# Patient Record
Sex: FEMALE | Race: WHITE | NOT HISPANIC OR LATINO
[De-identification: names, ages, dates, MRNs, and addresses within clinical notes are randomized per-mention and may not be internally consistent; named-entity substitution may affect disease eponyms.]

---

## 2017-10-03 ENCOUNTER — RESULT REVIEW (OUTPATIENT)
Age: 67
End: 2017-10-03

## 2017-10-03 ENCOUNTER — OUTPATIENT (OUTPATIENT)
Dept: OUTPATIENT SERVICES | Facility: HOSPITAL | Age: 67
LOS: 1 days | Discharge: HOME | End: 2017-10-03

## 2017-10-05 LAB — SURGICAL PATHOLOGY STUDY: SIGNIFICANT CHANGE UP

## 2017-10-09 DIAGNOSIS — C54.1 MALIGNANT NEOPLASM OF ENDOMETRIUM: ICD-10-CM

## 2017-10-09 DIAGNOSIS — Z88.0 ALLERGY STATUS TO PENICILLIN: ICD-10-CM

## 2017-10-09 DIAGNOSIS — Z87.891 PERSONAL HISTORY OF NICOTINE DEPENDENCE: ICD-10-CM

## 2017-10-09 DIAGNOSIS — Z88.1 ALLERGY STATUS TO OTHER ANTIBIOTIC AGENTS STATUS: ICD-10-CM

## 2017-10-11 ENCOUNTER — APPOINTMENT (OUTPATIENT)
Dept: GYNECOLOGIC ONCOLOGY | Facility: CLINIC | Age: 67
End: 2017-10-11
Payer: COMMERCIAL

## 2017-10-11 VITALS
HEIGHT: 70.5 IN | BODY MASS INDEX: 24.07 KG/M2 | OXYGEN SATURATION: 98 % | SYSTOLIC BLOOD PRESSURE: 159 MMHG | HEART RATE: 88 BPM | WEIGHT: 170 LBS | DIASTOLIC BLOOD PRESSURE: 79 MMHG

## 2017-10-11 DIAGNOSIS — F41.1 GENERALIZED ANXIETY DISORDER: ICD-10-CM

## 2017-10-11 DIAGNOSIS — Z87.891 PERSONAL HISTORY OF NICOTINE DEPENDENCE: ICD-10-CM

## 2017-10-11 DIAGNOSIS — Z85.828 PERSONAL HISTORY OF OTHER MALIGNANT NEOPLASM OF SKIN: ICD-10-CM

## 2017-10-11 PROCEDURE — 99205 OFFICE O/P NEW HI 60 MIN: CPT

## 2017-10-11 PROCEDURE — 36415 COLL VENOUS BLD VENIPUNCTURE: CPT

## 2017-10-11 RX ORDER — OMEPRAZOLE 20 MG/1
20 CAPSULE, DELAYED RELEASE ORAL
Qty: 30 | Refills: 0 | Status: COMPLETED | COMMUNITY
Start: 2017-08-20

## 2017-10-11 RX ORDER — ALPRAZOLAM 1 MG/1
1 TABLET ORAL
Qty: 60 | Refills: 0 | Status: COMPLETED | COMMUNITY
Start: 2017-08-22

## 2017-10-11 RX ORDER — AMITRIPTYLINE HYDROCHLORIDE 10 MG/1
10 TABLET, FILM COATED ORAL
Qty: 60 | Refills: 0 | Status: COMPLETED | COMMUNITY
Start: 2017-07-18

## 2017-10-11 RX ORDER — AZITHROMYCIN 250 MG/1
250 TABLET, FILM COATED ORAL
Qty: 6 | Refills: 0 | Status: COMPLETED | COMMUNITY
Start: 2017-08-20

## 2017-10-12 LAB — CANCER AG125 SERPL-ACNC: 108 U/ML

## 2017-10-14 ENCOUNTER — OUTPATIENT (OUTPATIENT)
Dept: OUTPATIENT SERVICES | Facility: HOSPITAL | Age: 67
LOS: 1 days | End: 2017-10-14
Payer: COMMERCIAL

## 2017-10-14 PROCEDURE — 74177 CT ABD & PELVIS W/CONTRAST: CPT | Mod: 26

## 2017-10-14 PROCEDURE — 71260 CT THORAX DX C+: CPT | Mod: 26

## 2017-10-14 PROCEDURE — 74177 CT ABD & PELVIS W/CONTRAST: CPT

## 2017-10-14 PROCEDURE — 71260 CT THORAX DX C+: CPT

## 2017-10-17 ENCOUNTER — INPATIENT (INPATIENT)
Facility: HOSPITAL | Age: 67
LOS: 1 days | Discharge: HOME | DRG: 740 | End: 2017-10-19
Attending: OBSTETRICS & GYNECOLOGY | Admitting: OBSTETRICS & GYNECOLOGY
Payer: COMMERCIAL

## 2017-10-17 ENCOUNTER — APPOINTMENT (OUTPATIENT)
Dept: GYNECOLOGIC ONCOLOGY | Facility: HOSPITAL | Age: 67
End: 2017-10-17

## 2017-10-17 ENCOUNTER — RESULT REVIEW (OUTPATIENT)
Age: 67
End: 2017-10-17

## 2017-10-17 VITALS
RESPIRATION RATE: 18 BRPM | DIASTOLIC BLOOD PRESSURE: 62 MMHG | HEIGHT: 70.5 IN | OXYGEN SATURATION: 98 % | SYSTOLIC BLOOD PRESSURE: 18 MMHG | HEART RATE: 80 BPM | TEMPERATURE: 96 F | WEIGHT: 169.09 LBS

## 2017-10-17 PROCEDURE — 58210 EXTENSIVE HYSTERECTOMY: CPT | Mod: 22

## 2017-10-17 PROCEDURE — 58210 EXTENSIVE HYSTERECTOMY: CPT | Mod: 80

## 2017-10-17 RX ORDER — ACETAMINOPHEN 500 MG
975 TABLET ORAL EVERY 8 HOURS
Qty: 0 | Refills: 0 | Status: COMPLETED | OUTPATIENT
Start: 2017-10-17 | End: 2017-10-18

## 2017-10-17 RX ORDER — OXYCODONE HYDROCHLORIDE 5 MG/1
10 TABLET ORAL EVERY 4 HOURS
Qty: 0 | Refills: 0 | Status: DISCONTINUED | OUTPATIENT
Start: 2017-10-17 | End: 2017-10-19

## 2017-10-17 RX ORDER — BUPIVACAINE 13.3 MG/ML
20 INJECTION, SUSPENSION, LIPOSOMAL INFILTRATION ONCE
Qty: 0 | Refills: 0 | Status: DISCONTINUED | OUTPATIENT
Start: 2017-10-17 | End: 2017-10-19

## 2017-10-17 RX ORDER — DEXAMETHASONE 0.5 MG/5ML
4 ELIXIR ORAL EVERY 8 HOURS
Qty: 0 | Refills: 0 | Status: COMPLETED | OUTPATIENT
Start: 2017-10-17 | End: 2017-10-18

## 2017-10-17 RX ORDER — HYDROMORPHONE HYDROCHLORIDE 2 MG/ML
0.2 INJECTION INTRAMUSCULAR; INTRAVENOUS; SUBCUTANEOUS
Qty: 0 | Refills: 0 | Status: DISCONTINUED | OUTPATIENT
Start: 2017-10-17 | End: 2017-10-19

## 2017-10-17 RX ORDER — OXYCODONE HYDROCHLORIDE 5 MG/1
5 TABLET ORAL EVERY 4 HOURS
Qty: 0 | Refills: 0 | Status: DISCONTINUED | OUTPATIENT
Start: 2017-10-17 | End: 2017-10-19

## 2017-10-17 RX ORDER — KETOROLAC TROMETHAMINE 30 MG/ML
30 SYRINGE (ML) INJECTION EVERY 8 HOURS
Qty: 0 | Refills: 0 | Status: DISCONTINUED | OUTPATIENT
Start: 2017-10-17 | End: 2017-10-18

## 2017-10-17 RX ORDER — ONDANSETRON 8 MG/1
8 TABLET, FILM COATED ORAL EVERY 8 HOURS
Qty: 0 | Refills: 0 | Status: COMPLETED | OUTPATIENT
Start: 2017-10-17 | End: 2017-10-18

## 2017-10-17 RX ORDER — SODIUM CHLORIDE 9 MG/ML
1000 INJECTION, SOLUTION INTRAVENOUS
Qty: 0 | Refills: 0 | Status: DISCONTINUED | OUTPATIENT
Start: 2017-10-17 | End: 2017-10-18

## 2017-10-17 RX ORDER — AMITRIPTYLINE HCL 25 MG
10 TABLET ORAL AT BEDTIME
Qty: 0 | Refills: 0 | Status: DISCONTINUED | OUTPATIENT
Start: 2017-10-17 | End: 2017-10-19

## 2017-10-17 RX ORDER — ENOXAPARIN SODIUM 100 MG/ML
40 INJECTION SUBCUTANEOUS EVERY 24 HOURS
Qty: 0 | Refills: 0 | Status: DISCONTINUED | OUTPATIENT
Start: 2017-10-17 | End: 2017-10-19

## 2017-10-17 RX ORDER — ALPRAZOLAM 0.25 MG
0.5 TABLET ORAL THREE TIMES A DAY
Qty: 0 | Refills: 0 | Status: DISCONTINUED | OUTPATIENT
Start: 2017-10-17 | End: 2017-10-19

## 2017-10-17 RX ADMIN — Medication 0.5 MILLIGRAM(S): at 22:38

## 2017-10-17 RX ADMIN — Medication 30 MILLIGRAM(S): at 23:00

## 2017-10-17 RX ADMIN — ONDANSETRON 8 MILLIGRAM(S): 8 TABLET, FILM COATED ORAL at 22:38

## 2017-10-17 RX ADMIN — Medication 4 MILLIGRAM(S): at 18:55

## 2017-10-17 RX ADMIN — Medication 975 MILLIGRAM(S): at 23:37

## 2017-10-17 RX ADMIN — Medication 30 MILLIGRAM(S): at 22:39

## 2017-10-17 RX ADMIN — Medication 975 MILLIGRAM(S): at 22:37

## 2017-10-17 RX ADMIN — ENOXAPARIN SODIUM 40 MILLIGRAM(S): 100 INJECTION SUBCUTANEOUS at 22:38

## 2017-10-17 NOTE — BRIEF OPERATIVE NOTE - PROCEDURE
<<-----Click on this checkbox to enter Procedure Lymph node dissection  10/17/2017    Active  TORI  Omentectomy  10/17/2017    Active  TORI  Total abdominal hysterectomy and bilateral salpingo-oophorectomy  10/17/2017    Active  TORI

## 2017-10-17 NOTE — H&P ADULT - HISTORY OF PRESENT ILLNESS
68yo  presenting with D&C confirmed diagnosis of clear cell adenocarcinoma of the uterus s/p long history of abnormal uterine bleeding.  Today, admitted for ex lap EMY, BSO, omentectomy, pelvic/para-aortic lymph node dissection.

## 2017-10-17 NOTE — BRIEF OPERATIVE NOTE - OPERATION/FINDINGS
17cm uterus, bilateral fallopian tubes and ovaries wnl, mild adhesions, enlarged para-aortic/pelvic nodes

## 2017-10-17 NOTE — PROGRESS NOTE ADULT - SUBJECTIVE AND OBJECTIVE BOX
GYN POC    Pt reports pain well controlled and is resting comfortably.  Denies n/v, fever, chills, chest pain, sob.    T(F): 97.5 (10-17-17 @ 14:05), Max: 97.8 (10-17-17 @ 11:53)  HR: 75 (10-17-17 @ 17:57) (67 - 91)  BP: 114/67 (10-17-17 @ 17:57) (18/62 - 130/54)  RR: 16 (10-17-17 @ 17:57) (14 - 18)  SpO2: 97% (10-17-17 @ 17:57) (94% - 100%)  Wt(kg): --    10-17 @ 07:01  -  10-17 @ 18:07  --------------------------------------------------------  IN: 2775 mL / OUT: 410 mL / NET: 2365 mL    acetaminophen   Tablet. 975 milliGRAM(s) Oral every 8 hours  ALPRAZolam 0.5 milliGRAM(s) Oral three times a day  amitriptyline 10 milliGRAM(s) Oral at bedtime  BUpivacaine liposome 1.3% Injectable (no eMAR) 20 milliLiter(s) Local Injection once  dexamethasone     Tablet 4 milliGRAM(s) Oral every 8 hours  dextrose 5% + sodium chloride 0.45%. 1000 milliLiter(s) IV Continuous <Continuous>  enoxaparin Injectable 40 milliGRAM(s) SubCutaneous every 24 hours  HYDROmorphone  Injectable 0.2 milliGRAM(s) IV Push every 3 hours PRN breakthrough  ketorolac   Injectable 30 milliGRAM(s) IV Push every 8 hours PRN Severe Pain  ondansetron Injectable 8 milliGRAM(s) IV Push every 8 hours  oxyCODONE    IR 5 milliGRAM(s) Oral every 4 hours PRN Moderate Pain (4 - 6)  oxyCODONE    IR 10 milliGRAM(s) Oral every 4 hours PRN Severe Pain (7 - 10)      Physical exam:  Constitutional: NAD  Pulmonary: clear to auscultation bilaterally  Cardiovascular: regular rate and rhythm  Abdomen: Soft, appropriately tender, nondistended, incision under clean jesusita vac  Extremities: no lower extremity edema, or calve tenderness. SCDs in place    A: POD0 s/p ex lap EMY, BSO, p/p LND, omentectomy.  Pt doing very well s/p operation.     Plan:  1. Vital signs stable, continue to monitor per protocol  2. Pain mgmt as ordered  3. DVT prophylaxis: SCDs  4. CV: IVH   5. Pulm: Incentive spirometer (at least 10 times per hour while awake)   6. GI: Clear diet  7. : Junior   8. Follow up labs: AM CBC  9. Activity: bedrest tonight     Welebir PGY4

## 2017-10-17 NOTE — H&P ADULT - ASSESSMENT
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

## 2017-10-17 NOTE — PROGRESS NOTE ADULT - ASSESSMENT
A: POD0 s/p ex lap MEY, BSO, p/p LND, omentectomy.  Pt doing very well s/p operation.     Plan:  1. Vital signs stable, continue to monitor per protocol  2. Pain mgmt as ordered  3. DVT prophylaxis: SCDs  4. CV: IVH   5. Pulm: Incentive spirometer (at least 10 times per hour while awake)   6. GI: Clear diet  7. : Junior   8. Follow up labs: AM CBC  9. Activity: bedrest tonight

## 2017-10-18 LAB
ALBUMIN SERPL ELPH-MCNC: 3.9 G/DL — SIGNIFICANT CHANGE UP (ref 3.3–5)
ALP SERPL-CCNC: 81 U/L — SIGNIFICANT CHANGE UP (ref 40–120)
ALT FLD-CCNC: 10 U/L — SIGNIFICANT CHANGE UP (ref 10–45)
ANION GAP SERPL CALC-SCNC: 13 MMOL/L — SIGNIFICANT CHANGE UP (ref 5–17)
AST SERPL-CCNC: 16 U/L — SIGNIFICANT CHANGE UP (ref 10–40)
BILIRUB SERPL-MCNC: 0.3 MG/DL — SIGNIFICANT CHANGE UP (ref 0.2–1.2)
BUN SERPL-MCNC: 8 MG/DL — SIGNIFICANT CHANGE UP (ref 7–23)
CALCIUM SERPL-MCNC: 9.1 MG/DL — SIGNIFICANT CHANGE UP (ref 8.4–10.5)
CHLORIDE SERPL-SCNC: 104 MMOL/L — SIGNIFICANT CHANGE UP (ref 96–108)
CO2 SERPL-SCNC: 25 MMOL/L — SIGNIFICANT CHANGE UP (ref 22–31)
CREAT SERPL-MCNC: 0.95 MG/DL — SIGNIFICANT CHANGE UP (ref 0.5–1.3)
GLUCOSE SERPL-MCNC: 146 MG/DL — HIGH (ref 70–99)
HCT VFR BLD CALC: 35.3 % — SIGNIFICANT CHANGE UP (ref 34.5–45)
HGB BLD-MCNC: 11.6 G/DL — SIGNIFICANT CHANGE UP (ref 11.5–15.5)
MAGNESIUM SERPL-MCNC: 2.1 MG/DL — SIGNIFICANT CHANGE UP (ref 1.6–2.6)
MCHC RBC-ENTMCNC: 29.3 PG — SIGNIFICANT CHANGE UP (ref 27–34)
MCHC RBC-ENTMCNC: 32.9 G/DL — SIGNIFICANT CHANGE UP (ref 32–36)
MCV RBC AUTO: 89.1 FL — SIGNIFICANT CHANGE UP (ref 80–100)
NON-GYNECOLOGICAL CYTOLOGY STUDY: SIGNIFICANT CHANGE UP
PHOSPHATE SERPL-MCNC: 2.8 MG/DL — SIGNIFICANT CHANGE UP (ref 2.5–4.5)
PLATELET # BLD AUTO: 262 K/UL — SIGNIFICANT CHANGE UP (ref 150–400)
POTASSIUM SERPL-MCNC: 4.1 MMOL/L — SIGNIFICANT CHANGE UP (ref 3.5–5.3)
POTASSIUM SERPL-SCNC: 4.1 MMOL/L — SIGNIFICANT CHANGE UP (ref 3.5–5.3)
PROT SERPL-MCNC: 6.6 G/DL — SIGNIFICANT CHANGE UP (ref 6–8.3)
RBC # BLD: 3.96 M/UL — SIGNIFICANT CHANGE UP (ref 3.8–5.2)
RBC # FLD: 12.6 % — SIGNIFICANT CHANGE UP (ref 10.3–16.9)
SODIUM SERPL-SCNC: 142 MMOL/L — SIGNIFICANT CHANGE UP (ref 135–145)
WBC # BLD: 9.8 K/UL — SIGNIFICANT CHANGE UP (ref 3.8–10.5)
WBC # FLD AUTO: 9.8 K/UL — SIGNIFICANT CHANGE UP (ref 3.8–10.5)

## 2017-10-18 RX ORDER — ACETAMINOPHEN 500 MG
650 TABLET ORAL EVERY 6 HOURS
Qty: 0 | Refills: 0 | Status: DISCONTINUED | OUTPATIENT
Start: 2017-10-18 | End: 2017-10-19

## 2017-10-18 RX ADMIN — Medication 975 MILLIGRAM(S): at 06:42

## 2017-10-18 RX ADMIN — Medication 650 MILLIGRAM(S): at 17:43

## 2017-10-18 RX ADMIN — Medication 30 MILLIGRAM(S): at 05:53

## 2017-10-18 RX ADMIN — Medication 4 MILLIGRAM(S): at 11:24

## 2017-10-18 RX ADMIN — Medication 0.5 MILLIGRAM(S): at 13:46

## 2017-10-18 RX ADMIN — Medication 650 MILLIGRAM(S): at 18:15

## 2017-10-18 RX ADMIN — ONDANSETRON 8 MILLIGRAM(S): 8 TABLET, FILM COATED ORAL at 05:45

## 2017-10-18 RX ADMIN — Medication 0.5 MILLIGRAM(S): at 23:09

## 2017-10-18 RX ADMIN — Medication 30 MILLIGRAM(S): at 06:15

## 2017-10-18 RX ADMIN — Medication 4 MILLIGRAM(S): at 04:04

## 2017-10-18 RX ADMIN — ENOXAPARIN SODIUM 40 MILLIGRAM(S): 100 INJECTION SUBCUTANEOUS at 23:09

## 2017-10-18 RX ADMIN — Medication 0.5 MILLIGRAM(S): at 05:45

## 2017-10-18 RX ADMIN — Medication 975 MILLIGRAM(S): at 05:45

## 2017-10-18 RX ADMIN — Medication 10 MILLIGRAM(S): at 23:09

## 2017-10-18 NOTE — PROGRESS NOTE ADULT - ATTENDING COMMENTS
Patients seen and examined this morning. Tolerating GI soft diet this morning with minimal complaints. Pain well controlled on current regimen. Ambulation encouraged. Questions regarding surgery and recovery answered. Patient appeared less anxious this morning. Labs reviewed and appropriate. Void trial passed. Discussed likely dispo home tomorrow if she continues to have uncomplicated post op course.

## 2017-10-18 NOTE — PROGRESS NOTE ADULT - SUBJECTIVE AND OBJECTIVE BOX
POD1 am.  Pt doing well.  Pain well controlled overnight and rested.  Pt anxious about surgery, results, and the plan for the future, but no toxic complaint.  Tolerated clear diet and passed flatus.  Denies n/v/d, fever, chills, palpitations, chest pain, sob.    Vital Signs Last 24 Hrs  T(C): 36.6 (18 Oct 2017 05:44), Max: 36.6 (17 Oct 2017 11:53)  T(F): 97.9 (18 Oct 2017 05:44), Max: 97.9 (18 Oct 2017 05:44)  HR: 69 (18 Oct 2017 05:44) (67 - 97)  BP: 135/76 (18 Oct 2017 05:44) (18/62 - 155/78)  BP(mean): --  RR: 16 (18 Oct 2017 05:44) (14 - 18)  SpO2: 98% (18 Oct 2017 05:44) (94% - 100%)  I&O's Summary    17 Oct 2017 07:01  -  18 Oct 2017 06:14  --------------------------------------------------------  IN: 3265 mL / OUT: 2785 mL / NET: 480 mL      MEDICATIONS  (STANDING):  ALPRAZolam 0.5 milliGRAM(s) Oral three times a day  amitriptyline 10 milliGRAM(s) Oral at bedtime  BUpivacaine liposome 1.3% Injectable (no eMAR) 20 milliLiter(s) Local Injection once  dexamethasone     Tablet 4 milliGRAM(s) Oral every 8 hours  enoxaparin Injectable 40 milliGRAM(s) SubCutaneous every 24 hours    MEDICATIONS  (PRN):  HYDROmorphone  Injectable 0.2 milliGRAM(s) IV Push every 3 hours PRN breakthrough  oxyCODONE    IR 5 milliGRAM(s) Oral every 4 hours PRN Moderate Pain (4 - 6)  oxyCODONE    IR 10 milliGRAM(s) Oral every 4 hours PRN Severe Pain (7 - 10)    ROS: as per interval hpi    PE:  A&Ox3, NAD  CTAB  RRR  Abd s/appropriately tender/nd, Incision under cd jesusita vac, hypoactive BS  Ext no calf tenderness/swelling    A/P: POD1 s/p ex lap EMY, BSO, staging for clear cell endometrial adenocarcinoma.  Pt is progressing well and advancing to anticipated goals of postoperative recovery.    Plan:  FEN Advanced to regular diet  Neuro: Toradol + Tylenol q8x24h, oxycodone 5/10 PRN, Dilaudid BT, Psych home meds restarted  Cards: none  Pulm: none  GI: Monitor vac dressing, zofran for nausea q8  : s/p nicolas  Heme: f/u AM CBC  DVT ppx: Lovenox 40  General: Ambulation and IS encouraged    Welebir PGY4 POD1 am.  Pt doing well.  Pain well controlled overnight and rested.  Pt anxious about surgery, results, and the plan for the future. Tolerated clear diet and passed flatus.  Denies n/v/d, fever, chills, palpitations, chest pain, sob.    Vital Signs Last 24 Hrs  T(C): 36.6 (18 Oct 2017 05:44), Max: 36.6 (17 Oct 2017 11:53)  T(F): 97.9 (18 Oct 2017 05:44), Max: 97.9 (18 Oct 2017 05:44)  HR: 69 (18 Oct 2017 05:44) (67 - 97)  BP: 135/76 (18 Oct 2017 05:44) (18/62 - 155/78)  BP(mean): --  RR: 16 (18 Oct 2017 05:44) (14 - 18)  SpO2: 98% (18 Oct 2017 05:44) (94% - 100%)  I&O's Summary    17 Oct 2017 07:01  -  18 Oct 2017 06:14  --------------------------------------------------------  IN: 3265 mL / OUT: 2785 mL / NET: 480 mL      MEDICATIONS  (STANDING):  ALPRAZolam 0.5 milliGRAM(s) Oral three times a day  amitriptyline 10 milliGRAM(s) Oral at bedtime  BUpivacaine liposome 1.3% Injectable (no eMAR) 20 milliLiter(s) Local Injection once  dexamethasone     Tablet 4 milliGRAM(s) Oral every 8 hours  enoxaparin Injectable 40 milliGRAM(s) SubCutaneous every 24 hours    MEDICATIONS  (PRN):  HYDROmorphone  Injectable 0.2 milliGRAM(s) IV Push every 3 hours PRN breakthrough  oxyCODONE    IR 5 milliGRAM(s) Oral every 4 hours PRN Moderate Pain (4 - 6)  oxyCODONE    IR 10 milliGRAM(s) Oral every 4 hours PRN Severe Pain (7 - 10)    ROS: as per interval hpi    PE:  A&Ox3, NAD  CTAB  RRR  Abd s/appropriately tender/nd, Incision under cd jesusita vac, hypoactive BS  Ext no calf tenderness/swelling    A/P: POD1 s/p ex lap EMY, BSO, staging for clear cell endometrial adenocarcinoma.  Pt is progressing well and advancing to anticipated goals of postoperative recovery.    Plan:  FEN Advanced to regular diet  Neuro: Toradol + Tylenol q8x24h, oxycodone 5/10 PRN, Dilaudid BT, Psych home meds restarted  Cards: none  Pulm: none  GI: Monitor vac dressing, zofran for nausea q8  : s/p nicolas  Heme: f/u AM CBC  DVT ppx: Lovenox 40  General: Ambulation and IS encouraged    Welebir PGY4

## 2017-10-18 NOTE — PROGRESS NOTE ADULT - SUBJECTIVE AND OBJECTIVE BOX
POD1 pm.  Pt doing very well, though intermittent periods of anxiety continue.  Pain well controlled.  Reports flatus, tolerated diet, ambulating routinely.  Denies n/v/d, fever, chills, palpitations, chest pain, sob.    Vital Signs Last 24 Hrs  T(C): 36.1 (18 Oct 2017 08:50), Max: 36.6 (18 Oct 2017 05:44)  T(F): 97 (18 Oct 2017 08:50), Max: 97.9 (18 Oct 2017 05:44)  HR: 78 (18 Oct 2017 08:50) (69 - 97)  BP: 128/74 (18 Oct 2017 08:50) (114/67 - 155/78)  BP(mean): --  RR: 17 (18 Oct 2017 08:50) (14 - 17)  SpO2: 95% (18 Oct 2017 08:50) (94% - 98%)  I&O's Summary    17 Oct 2017 07:01  -  18 Oct 2017 07:00  --------------------------------------------------------  IN: 4640 mL / OUT: 2785 mL / NET: 1855 mL    18 Oct 2017 07:01  -  18 Oct 2017 14:59  --------------------------------------------------------  IN: 540 mL / OUT: 600 mL / NET: -60 mL      MEDICATIONS  (STANDING):  ALPRAZolam 0.5 milliGRAM(s) Oral three times a day  amitriptyline 10 milliGRAM(s) Oral at bedtime  BUpivacaine liposome 1.3% Injectable (no eMAR) 20 milliLiter(s) Local Injection once  enoxaparin Injectable 40 milliGRAM(s) SubCutaneous every 24 hours    MEDICATIONS  (PRN):  acetaminophen   Tablet. 650 milliGRAM(s) Oral every 6 hours PRN Mild Pain (1 - 3)  HYDROmorphone  Injectable 0.2 milliGRAM(s) IV Push every 3 hours PRN breakthrough  oxyCODONE    IR 5 milliGRAM(s) Oral every 4 hours PRN Moderate Pain (4 - 6)  oxyCODONE    IR 10 milliGRAM(s) Oral every 4 hours PRN Severe Pain (7 - 10)    ROS: as per interval hpi    PE:  A&Ox3, NAD  CTAB  RRR  Abd s/mildly tender/nd, incision under cd jesusita vac  Ext no calf tenderness or swelling      LABS:                        11.6   9.8   )-----------( 262      ( 18 Oct 2017 06:50 )             35.3 POD1 pm.  Pt doing very well, though intermittent periods of anxiety continue.  Pain well controlled.  Reports flatus, tolerated diet, ambulating routinely.  Denies n/v/d, fever, chills, palpitations, chest pain, sob.    Vital Signs Last 24 Hrs  T(C): 36.1 (18 Oct 2017 08:50), Max: 36.6 (18 Oct 2017 05:44)  T(F): 97 (18 Oct 2017 08:50), Max: 97.9 (18 Oct 2017 05:44)  HR: 78 (18 Oct 2017 08:50) (69 - 97)  BP: 128/74 (18 Oct 2017 08:50) (114/67 - 155/78)  BP(mean): --  RR: 17 (18 Oct 2017 08:50) (14 - 17)  SpO2: 95% (18 Oct 2017 08:50) (94% - 98%)  I&O's Summary    17 Oct 2017 07:01  -  18 Oct 2017 07:00  --------------------------------------------------------  IN: 4640 mL / OUT: 2785 mL / NET: 1855 mL    18 Oct 2017 07:01  -  18 Oct 2017 14:59  --------------------------------------------------------  IN: 540 mL / OUT: 600 mL / NET: -60 mL      MEDICATIONS  (STANDING):  ALPRAZolam 0.5 milliGRAM(s) Oral three times a day  amitriptyline 10 milliGRAM(s) Oral at bedtime  BUpivacaine liposome 1.3% Injectable (no eMAR) 20 milliLiter(s) Local Injection once  enoxaparin Injectable 40 milliGRAM(s) SubCutaneous every 24 hours    MEDICATIONS  (PRN):  acetaminophen   Tablet. 650 milliGRAM(s) Oral every 6 hours PRN Mild Pain (1 - 3)  HYDROmorphone  Injectable 0.2 milliGRAM(s) IV Push every 3 hours PRN breakthrough  oxyCODONE    IR 5 milliGRAM(s) Oral every 4 hours PRN Moderate Pain (4 - 6)  oxyCODONE    IR 10 milliGRAM(s) Oral every 4 hours PRN Severe Pain (7 - 10)    ROS: as per interval hpi    PE:  A&Ox3, NAD  CTAB  RRR  Abd s/mildly tender/nd, incision under cd jesusita vac  Ext no calf tenderness or swelling      LABS:                        11.6   9.8   )-----------( 262      ( 18 Oct 2017 06:50 )             35.3     A/P: POD1 pm s/p ex lap EMY, BSO, staging for clear cell endometrial adenocarcinoma.  Pt is recovery without complication and meeting all milestones of advancement.    Plan:  FEN Advanced to low residue diet  Neuro: Tylenol PRN, oxycodone 5/10 PRN, Dilaudid PRN BT, Xanax 0.5 TID, Amitryptaline 10 qHS  Cards: none  Pulm: none  GI: Monitor vac dressing, zofran for nausea q8  : s/p nicolas, voiding  Heme: f/u CMP, mg, phos  DVT ppx: Lovenox 40  General: Ambulation and IS encouraged    Welebir PGY4

## 2017-10-19 ENCOUNTER — TRANSCRIPTION ENCOUNTER (OUTPATIENT)
Age: 67
End: 2017-10-19

## 2017-10-19 VITALS
TEMPERATURE: 98 F | HEART RATE: 74 BPM | DIASTOLIC BLOOD PRESSURE: 70 MMHG | RESPIRATION RATE: 17 BRPM | SYSTOLIC BLOOD PRESSURE: 107 MMHG | OXYGEN SATURATION: 99 %

## 2017-10-19 PROCEDURE — 86923 COMPATIBILITY TEST ELECTRIC: CPT

## 2017-10-19 PROCEDURE — 88305 TISSUE EXAM BY PATHOLOGIST: CPT

## 2017-10-19 PROCEDURE — 88360 TUMOR IMMUNOHISTOCHEM/MANUAL: CPT

## 2017-10-19 PROCEDURE — 83735 ASSAY OF MAGNESIUM: CPT

## 2017-10-19 PROCEDURE — 88307 TISSUE EXAM BY PATHOLOGIST: CPT

## 2017-10-19 PROCEDURE — 86850 RBC ANTIBODY SCREEN: CPT

## 2017-10-19 PROCEDURE — 86900 BLOOD TYPING SEROLOGIC ABO: CPT

## 2017-10-19 PROCEDURE — 80053 COMPREHEN METABOLIC PANEL: CPT

## 2017-10-19 PROCEDURE — 84100 ASSAY OF PHOSPHORUS: CPT

## 2017-10-19 PROCEDURE — 88112 CYTOPATH CELL ENHANCE TECH: CPT

## 2017-10-19 PROCEDURE — 36415 COLL VENOUS BLD VENIPUNCTURE: CPT

## 2017-10-19 PROCEDURE — 88342 IMHCHEM/IMCYTCHM 1ST ANTB: CPT

## 2017-10-19 PROCEDURE — 88341 IMHCHEM/IMCYTCHM EA ADD ANTB: CPT

## 2017-10-19 PROCEDURE — 85027 COMPLETE CBC AUTOMATED: CPT

## 2017-10-19 PROCEDURE — 86901 BLOOD TYPING SEROLOGIC RH(D): CPT

## 2017-10-19 RX ORDER — AMITRIPTYLINE HCL 25 MG
1 TABLET ORAL
Qty: 0 | Refills: 0 | COMMUNITY

## 2017-10-19 RX ORDER — FAMOTIDINE 10 MG/ML
20 INJECTION INTRAVENOUS DAILY
Qty: 0 | Refills: 0 | Status: DISCONTINUED | OUTPATIENT
Start: 2017-10-19 | End: 2017-10-19

## 2017-10-19 RX ORDER — ALPRAZOLAM 0.25 MG
1 TABLET ORAL
Qty: 0 | Refills: 0 | COMMUNITY

## 2017-10-19 RX ADMIN — FAMOTIDINE 20 MILLIGRAM(S): 10 INJECTION INTRAVENOUS at 07:55

## 2017-10-19 RX ADMIN — Medication 650 MILLIGRAM(S): at 11:15

## 2017-10-19 RX ADMIN — Medication 0.5 MILLIGRAM(S): at 07:19

## 2017-10-19 RX ADMIN — Medication 650 MILLIGRAM(S): at 09:13

## 2017-10-19 NOTE — DISCHARGE NOTE ADULT - HOSPITAL COURSE
68yo F s/p exploratory laparotomy, total abdominal hysterectomy, bilateral salpingo-oophorectomy, lymph node dissection, and omentectomy for clear cell endometrial adenocarcinoma. Pt is hemodynamically stable, tolerating regular diet, urinating adequately, pain controlled at time of discharge.

## 2017-10-19 NOTE — DISCHARGE NOTE ADULT - CARE PLAN
Principal Discharge DX:	Endometrial cancer  Goal:	Recovery  Instructions for follow-up, activity and diet:	Pelvic rest (nothing per vagina) x6 weeks or until otherwise instructed by physician. Follow up with Dr. Urbina in 1-2 wks. Go to nearest ED if fever >100.4, severe abdominal pain or heavy vaginal bleeding. Principal Discharge DX:	Endometrial cancer  Goal:	Recovery  Instructions for follow-up, activity and diet:	Pelvic rest (nothing per vagina) x6 weeks or until otherwise instructed by physician. Follow up with Dr. Urbina on 10/26/17 at 10:00 AM in office. Go to nearest ED if fever >100.4, severe abdominal pain or heavy vaginal bleeding.

## 2017-10-19 NOTE — DISCHARGE NOTE ADULT - CARE PROVIDER_API CALL
Beatriz Urbina (DO), Gynecologic Oncology; Obstetrics and Gynecology  [address], [place] [address]  Phone: [phone]  Fax: [phone]

## 2017-10-19 NOTE — PROGRESS NOTE ADULT - SUBJECTIVE AND OBJECTIVE BOX
POD2 am.  Pt reports acid reflux, otherwise no acute complaints.  Reports pain well controlled, tolerating diet without complication, ambulating.  Denies n/v/d, fever, chills, chest pain, sob, palpitations.    Vital Signs Last 24 Hrs  T(C): 35.9 (19 Oct 2017 05:02), Max: 36.7 (18 Oct 2017 13:12)  T(F): 96.6 (19 Oct 2017 05:02), Max: 98 (18 Oct 2017 13:12)  HR: 79 (19 Oct 2017 05:02) (76 - 80)  BP: 152/69 (19 Oct 2017 05:02) (128/74 - 154/84)  BP(mean): --  RR: 17 (19 Oct 2017 05:02) (15 - 17)  SpO2: 96% (19 Oct 2017 05:02) (95% - 98%)  I&O's Summary    18 Oct 2017 07:01  -  19 Oct 2017 07:00  --------------------------------------------------------  IN: 640 mL / OUT: 2050 mL / NET: -1410 mL      MEDICATIONS  (STANDING):  ALPRAZolam 0.5 milliGRAM(s) Oral three times a day  amitriptyline 10 milliGRAM(s) Oral at bedtime  BUpivacaine liposome 1.3% Injectable (no eMAR) 20 milliLiter(s) Local Injection once  enoxaparin Injectable 40 milliGRAM(s) SubCutaneous every 24 hours    MEDICATIONS  (PRN):  acetaminophen   Tablet. 650 milliGRAM(s) Oral every 6 hours PRN Mild Pain (1 - 3)  HYDROmorphone  Injectable 0.2 milliGRAM(s) IV Push every 3 hours PRN breakthrough  oxyCODONE    IR 5 milliGRAM(s) Oral every 4 hours PRN Moderate Pain (4 - 6)  oxyCODONE    IR 10 milliGRAM(s) Oral every 4 hours PRN Severe Pain (7 - 10)    ROS: as per interval hpi    PE:  A&Ox3, NAD  CTAB  RRR  Abd s/appropriately tender/nd, incision under cdi wound vac  Ext no calf tenderness/swelling    LABS:                        11.6   9.8   )-----------( 262      ( 18 Oct 2017 06:50 )             35.3     10-18    142  |  104  |  8   ----------------------------<  146<H>  4.1   |  25  |  0.95    Ca    9.1      18 Oct 2017 15:55  Phos  2.8     10-18  Mg     2.1     10-18    TPro  6.6  /  Alb  3.9  /  TBili  0.3  /  DBili  x   /  AST  16  /  ALT  10  /  AlkPhos  81  10-18      A/P: POD2 am s/p ex lap EMY, BSO, staging for clear cell endometrial adenocarcinoma.  Postoperative recovery without complication.  Met all milestones and ready for discharge pending attending approval    Plan:  FEN Advanced to low residue diet  Neuro: Tylenol PRN, oxycodone 5/10 PRN, Dilaudid PRN BT, Xanax 0.5 TID, Amitryptaline 10 qHS  Cards: none  Pulm: none  GI: Monitor vac dressing, zofran for nausea q8, pepcid for reflux  : s/p nicolas, voiding  DVT ppx: Lovenox 40  General: Ambulation and IS encouraged  Dispo: home pending attending approval    [place] PGY4

## 2017-10-19 NOTE — DISCHARGE NOTE ADULT - PLAN OF CARE
Recovery Pelvic rest (nothing per vagina) x6 weeks or until otherwise instructed by physician. Follow up with Dr. Urbina in 1-2 wks. Go to nearest ED if fever >100.4, severe abdominal pain or heavy vaginal bleeding. Pelvic rest (nothing per vagina) x6 weeks or until otherwise instructed by physician. Follow up with Dr. Urbina on 10/26/17 at 10:00 AM in office. Go to nearest ED if fever >100.4, severe abdominal pain or heavy vaginal bleeding.

## 2017-10-23 DIAGNOSIS — Z87.891 PERSONAL HISTORY OF NICOTINE DEPENDENCE: ICD-10-CM

## 2017-10-23 DIAGNOSIS — C54.1 MALIGNANT NEOPLASM OF ENDOMETRIUM: ICD-10-CM

## 2017-10-23 DIAGNOSIS — F41.9 ANXIETY DISORDER, UNSPECIFIED: ICD-10-CM

## 2017-10-23 DIAGNOSIS — F32.9 MAJOR DEPRESSIVE DISORDER, SINGLE EPISODE, UNSPECIFIED: ICD-10-CM

## 2017-10-24 LAB — SURGICAL PATHOLOGY STUDY: SIGNIFICANT CHANGE UP

## 2017-10-26 ENCOUNTER — APPOINTMENT (OUTPATIENT)
Dept: GYNECOLOGIC ONCOLOGY | Facility: CLINIC | Age: 67
End: 2017-10-26
Payer: COMMERCIAL

## 2017-10-26 VITALS
BODY MASS INDEX: 24.07 KG/M2 | DIASTOLIC BLOOD PRESSURE: 70 MMHG | HEART RATE: 85 BPM | WEIGHT: 170 LBS | OXYGEN SATURATION: 98 % | SYSTOLIC BLOOD PRESSURE: 147 MMHG | HEIGHT: 70.5 IN

## 2017-10-26 PROCEDURE — 99215 OFFICE O/P EST HI 40 MIN: CPT | Mod: 24

## 2017-10-30 NOTE — CHART NOTE - NSCHARTNOTEFT_GEN_A_CORE
Pathology from patient's surgery 10/17/17 confirms uterine clear cell carcinoma with metastases to para aortic lymph nodes. Diagnosis present upon admission.

## 2017-11-01 DIAGNOSIS — C77.2 SECONDARY AND UNSPECIFIED MALIGNANT NEOPLASM OF INTRA-ABDOMINAL LYMPH NODES: ICD-10-CM

## 2017-11-08 ENCOUNTER — APPOINTMENT (OUTPATIENT)
Dept: GYNECOLOGIC ONCOLOGY | Facility: CLINIC | Age: 67
End: 2017-11-08
Payer: COMMERCIAL

## 2017-11-08 VITALS
HEART RATE: 80 BPM | SYSTOLIC BLOOD PRESSURE: 147 MMHG | BODY MASS INDEX: 34.23 KG/M2 | HEIGHT: 60.5 IN | DIASTOLIC BLOOD PRESSURE: 76 MMHG | WEIGHT: 179 LBS

## 2017-11-08 PROCEDURE — 36415 COLL VENOUS BLD VENIPUNCTURE: CPT

## 2017-11-08 PROCEDURE — 99214 OFFICE O/P EST MOD 30 MIN: CPT | Mod: 24

## 2017-11-09 LAB
ALBUMIN SERPL ELPH-MCNC: 4.6 G/DL
ALP BLD-CCNC: 89 U/L
ALT SERPL-CCNC: 10 U/L
ANION GAP SERPL CALC-SCNC: 19 MMOL/L
AST SERPL-CCNC: 17 U/L
BASOPHILS # BLD AUTO: 0.04 K/UL
BASOPHILS NFR BLD AUTO: 0.7 %
BILIRUB SERPL-MCNC: 0.2 MG/DL
BUN SERPL-MCNC: 20 MG/DL
CALCIUM SERPL-MCNC: 9.4 MG/DL
CANCER AG125 SERPL-ACNC: 216 U/ML
CHLORIDE SERPL-SCNC: 101 MMOL/L
CO2 SERPL-SCNC: 22 MMOL/L
CREAT SERPL-MCNC: 0.95 MG/DL
EOSINOPHIL # BLD AUTO: 0.16 K/UL
EOSINOPHIL NFR BLD AUTO: 2.8 %
HCT VFR BLD CALC: 42.5 %
HGB BLD-MCNC: 13.3 G/DL
IMM GRANULOCYTES NFR BLD AUTO: 0.2 %
LYMPHOCYTES # BLD AUTO: 1.96 K/UL
LYMPHOCYTES NFR BLD AUTO: 33.7 %
MAGNESIUM SERPL-MCNC: 2.1 MG/DL
MAN DIFF?: NORMAL
MCHC RBC-ENTMCNC: 29.6 PG
MCHC RBC-ENTMCNC: 31.3 GM/DL
MCV RBC AUTO: 94.4 FL
MONOCYTES # BLD AUTO: 0.34 K/UL
MONOCYTES NFR BLD AUTO: 5.9 %
NEUTROPHILS # BLD AUTO: 3.3 K/UL
NEUTROPHILS NFR BLD AUTO: 56.7 %
PLATELET # BLD AUTO: 282 K/UL
POTASSIUM SERPL-SCNC: 5 MMOL/L
PROT SERPL-MCNC: 7.4 G/DL
RBC # BLD: 4.5 M/UL
RBC # FLD: 14.3 %
SODIUM SERPL-SCNC: 142 MMOL/L
WBC # FLD AUTO: 5.81 K/UL

## 2017-11-13 RX ORDER — PALONOSETRON HYDROCHLORIDE 0.25 MG/5ML
0.25 INJECTION, SOLUTION INTRAVENOUS ONCE
Qty: 0 | Refills: 0 | Status: DISCONTINUED | OUTPATIENT
Start: 2017-11-14 | End: 2017-11-29

## 2017-11-13 RX ORDER — DEXAMETHASONE 0.5 MG/5ML
12 ELIXIR ORAL ONCE
Qty: 0 | Refills: 0 | Status: DISCONTINUED | OUTPATIENT
Start: 2017-11-14 | End: 2017-11-29

## 2017-11-13 RX ORDER — FAMOTIDINE 10 MG/ML
20 INJECTION INTRAVENOUS ONCE
Qty: 0 | Refills: 0 | Status: DISCONTINUED | OUTPATIENT
Start: 2017-11-14 | End: 2017-11-29

## 2017-11-13 RX ORDER — DIPHENHYDRAMINE HCL 50 MG
25 CAPSULE ORAL ONCE
Qty: 0 | Refills: 0 | Status: DISCONTINUED | OUTPATIENT
Start: 2017-11-14 | End: 2017-11-29

## 2017-11-13 RX ORDER — CARBOPLATIN 50 MG
604 VIAL (EA) INTRAVENOUS ONCE
Qty: 0 | Refills: 0 | Status: DISCONTINUED | OUTPATIENT
Start: 2017-11-14 | End: 2017-11-29

## 2017-11-13 RX ORDER — PACLITAXEL 6 MG/ML
313 INJECTION, SOLUTION, CONCENTRATE INTRAVENOUS ONCE
Qty: 0 | Refills: 0 | Status: DISCONTINUED | OUTPATIENT
Start: 2017-11-14 | End: 2017-11-29

## 2017-11-14 ENCOUNTER — APPOINTMENT (OUTPATIENT)
Dept: INFUSION THERAPY | Facility: HOSPITAL | Age: 67
End: 2017-11-14

## 2017-11-14 ENCOUNTER — OUTPATIENT (OUTPATIENT)
Dept: OUTPATIENT SERVICES | Facility: HOSPITAL | Age: 67
LOS: 1 days | End: 2017-11-14
Payer: COMMERCIAL

## 2017-11-14 DIAGNOSIS — C54.1 MALIGNANT NEOPLASM OF ENDOMETRIUM: ICD-10-CM

## 2017-11-14 PROCEDURE — 96415 CHEMO IV INFUSION ADDL HR: CPT

## 2017-11-14 PROCEDURE — 96417 CHEMO IV INFUS EACH ADDL SEQ: CPT

## 2017-11-14 PROCEDURE — 96367 TX/PROPH/DG ADDL SEQ IV INF: CPT

## 2017-11-14 PROCEDURE — 96375 TX/PRO/DX INJ NEW DRUG ADDON: CPT

## 2017-11-14 PROCEDURE — 96413 CHEMO IV INFUSION 1 HR: CPT

## 2017-11-16 DIAGNOSIS — R11.2 NAUSEA WITH VOMITING, UNSPECIFIED: ICD-10-CM

## 2017-11-29 ENCOUNTER — APPOINTMENT (OUTPATIENT)
Dept: GYNECOLOGIC ONCOLOGY | Facility: CLINIC | Age: 67
End: 2017-11-29
Payer: COMMERCIAL

## 2017-11-29 VITALS
OXYGEN SATURATION: 97 % | WEIGHT: 177 LBS | HEART RATE: 83 BPM | SYSTOLIC BLOOD PRESSURE: 136 MMHG | DIASTOLIC BLOOD PRESSURE: 68 MMHG | BODY MASS INDEX: 29.49 KG/M2 | HEIGHT: 65 IN

## 2017-11-29 PROCEDURE — 36415 COLL VENOUS BLD VENIPUNCTURE: CPT

## 2017-11-29 PROCEDURE — 99214 OFFICE O/P EST MOD 30 MIN: CPT | Mod: 24

## 2017-12-01 LAB
ALBUMIN SERPL ELPH-MCNC: 4.2 G/DL
ALP BLD-CCNC: 82 U/L
ALT SERPL-CCNC: 17 U/L
ANION GAP SERPL CALC-SCNC: 15 MMOL/L
AST SERPL-CCNC: 21 U/L
BASOPHILS # BLD AUTO: 0.03 K/UL
BASOPHILS NFR BLD AUTO: 0.6 %
BILIRUB SERPL-MCNC: 0.2 MG/DL
BUN SERPL-MCNC: 15 MG/DL
CALCIUM SERPL-MCNC: 10 MG/DL
CANCER AG125 SERPL-ACNC: 84 U/ML
CHLORIDE SERPL-SCNC: 102 MMOL/L
CO2 SERPL-SCNC: 26 MMOL/L
CREAT SERPL-MCNC: 0.82 MG/DL
EOSINOPHIL # BLD AUTO: 0.05 K/UL
EOSINOPHIL NFR BLD AUTO: 1.1 %
HCT VFR BLD CALC: 41 %
HGB BLD-MCNC: 13 G/DL
IMM GRANULOCYTES NFR BLD AUTO: 0.4 %
LYMPHOCYTES # BLD AUTO: 1.58 K/UL
LYMPHOCYTES NFR BLD AUTO: 33.2 %
MAGNESIUM SERPL-MCNC: 2 MG/DL
MAN DIFF?: NORMAL
MCHC RBC-ENTMCNC: 29.5 PG
MCHC RBC-ENTMCNC: 31.7 GM/DL
MCV RBC AUTO: 93.2 FL
MONOCYTES # BLD AUTO: 0.55 K/UL
MONOCYTES NFR BLD AUTO: 11.6 %
NEUTROPHILS # BLD AUTO: 2.53 K/UL
NEUTROPHILS NFR BLD AUTO: 53.1 %
PLATELET # BLD AUTO: 220 K/UL
POTASSIUM SERPL-SCNC: 4.7 MMOL/L
PROT SERPL-MCNC: 6.8 G/DL
RBC # BLD: 4.4 M/UL
RBC # FLD: 13.6 %
SODIUM SERPL-SCNC: 143 MMOL/L
WBC # FLD AUTO: 4.76 K/UL

## 2017-12-04 RX ORDER — DEXAMETHASONE 0.5 MG/5ML
12 ELIXIR ORAL ONCE
Qty: 0 | Refills: 0 | Status: DISCONTINUED | OUTPATIENT
Start: 2017-12-05 | End: 2017-12-20

## 2017-12-04 RX ORDER — PALONOSETRON HYDROCHLORIDE 0.25 MG/5ML
0.25 INJECTION, SOLUTION INTRAVENOUS ONCE
Qty: 0 | Refills: 0 | Status: DISCONTINUED | OUTPATIENT
Start: 2017-12-05 | End: 2017-12-20

## 2017-12-04 RX ORDER — FAMOTIDINE 10 MG/ML
20 INJECTION INTRAVENOUS ONCE
Qty: 0 | Refills: 0 | Status: DISCONTINUED | OUTPATIENT
Start: 2017-12-05 | End: 2017-12-20

## 2017-12-04 RX ORDER — PACLITAXEL 6 MG/ML
329 INJECTION, SOLUTION, CONCENTRATE INTRAVENOUS ONCE
Qty: 0 | Refills: 0 | Status: DISCONTINUED | OUTPATIENT
Start: 2017-12-05 | End: 2017-12-20

## 2017-12-04 RX ORDER — DIPHENHYDRAMINE HCL 50 MG
25 CAPSULE ORAL ONCE
Qty: 0 | Refills: 0 | Status: DISCONTINUED | OUTPATIENT
Start: 2017-12-05 | End: 2017-12-20

## 2017-12-04 RX ORDER — CARBOPLATIN 50 MG
670.2 VIAL (EA) INTRAVENOUS ONCE
Qty: 0 | Refills: 0 | Status: DISCONTINUED | OUTPATIENT
Start: 2017-12-05 | End: 2017-12-20

## 2017-12-05 ENCOUNTER — APPOINTMENT (OUTPATIENT)
Dept: INFUSION THERAPY | Facility: HOSPITAL | Age: 67
End: 2017-12-05

## 2017-12-05 ENCOUNTER — OUTPATIENT (OUTPATIENT)
Dept: OUTPATIENT SERVICES | Facility: HOSPITAL | Age: 67
LOS: 1 days | End: 2017-12-05
Payer: COMMERCIAL

## 2017-12-05 DIAGNOSIS — C54.1 MALIGNANT NEOPLASM OF ENDOMETRIUM: ICD-10-CM

## 2017-12-05 PROCEDURE — 96415 CHEMO IV INFUSION ADDL HR: CPT

## 2017-12-05 PROCEDURE — 96417 CHEMO IV INFUS EACH ADDL SEQ: CPT

## 2017-12-05 PROCEDURE — 96413 CHEMO IV INFUSION 1 HR: CPT

## 2017-12-05 PROCEDURE — 96375 TX/PRO/DX INJ NEW DRUG ADDON: CPT

## 2017-12-11 DIAGNOSIS — R11.2 NAUSEA WITH VOMITING, UNSPECIFIED: ICD-10-CM

## 2017-12-18 ENCOUNTER — APPOINTMENT (OUTPATIENT)
Dept: GYNECOLOGIC ONCOLOGY | Facility: CLINIC | Age: 67
End: 2017-12-18
Payer: COMMERCIAL

## 2017-12-18 VITALS
SYSTOLIC BLOOD PRESSURE: 162 MMHG | HEART RATE: 82 BPM | WEIGHT: 179 LBS | OXYGEN SATURATION: 97 % | HEIGHT: 65 IN | DIASTOLIC BLOOD PRESSURE: 72 MMHG | BODY MASS INDEX: 29.82 KG/M2

## 2017-12-18 PROCEDURE — 99214 OFFICE O/P EST MOD 30 MIN: CPT | Mod: 24

## 2017-12-19 LAB
ALBUMIN SERPL ELPH-MCNC: 4 G/DL
ALP BLD-CCNC: 71 U/L
ALT SERPL-CCNC: 19 U/L
ANION GAP SERPL CALC-SCNC: 15 MMOL/L
AST SERPL-CCNC: 20 U/L
BASOPHILS # BLD AUTO: 0.02 K/UL
BASOPHILS NFR BLD AUTO: 0.5 %
BILIRUB SERPL-MCNC: <0.2 MG/DL
BUN SERPL-MCNC: 18 MG/DL
CALCIUM SERPL-MCNC: 8.8 MG/DL
CANCER AG125 SERPL-ACNC: 30 U/ML
CHLORIDE SERPL-SCNC: 104 MMOL/L
CO2 SERPL-SCNC: 23 MMOL/L
CREAT SERPL-MCNC: 0.93 MG/DL
EOSINOPHIL # BLD AUTO: 0.02 K/UL
EOSINOPHIL NFR BLD AUTO: 0.5 %
HCT VFR BLD CALC: 39.1 %
HGB BLD-MCNC: 12.4 G/DL
IMM GRANULOCYTES NFR BLD AUTO: 0.3 %
LYMPHOCYTES # BLD AUTO: 1.47 K/UL
LYMPHOCYTES NFR BLD AUTO: 38.6 %
MAGNESIUM SERPL-MCNC: 2 MG/DL
MAN DIFF?: NORMAL
MCHC RBC-ENTMCNC: 29.8 PG
MCHC RBC-ENTMCNC: 31.7 GM/DL
MCV RBC AUTO: 94 FL
MONOCYTES # BLD AUTO: 0.64 K/UL
MONOCYTES NFR BLD AUTO: 16.8 %
NEUTROPHILS # BLD AUTO: 1.65 K/UL
NEUTROPHILS NFR BLD AUTO: 43.3 %
PLATELET # BLD AUTO: 243 K/UL
POTASSIUM SERPL-SCNC: 4.7 MMOL/L
PROT SERPL-MCNC: 6.3 G/DL
RBC # BLD: 4.16 M/UL
RBC # FLD: 14.8 %
SODIUM SERPL-SCNC: 142 MMOL/L
WBC # FLD AUTO: 3.81 K/UL

## 2017-12-22 RX ORDER — FAMOTIDINE 10 MG/ML
20 INJECTION INTRAVENOUS ONCE
Qty: 0 | Refills: 0 | Status: DISCONTINUED | OUTPATIENT
Start: 2017-12-26 | End: 2018-01-10

## 2017-12-22 RX ORDER — PALONOSETRON HYDROCHLORIDE 0.25 MG/5ML
0.25 INJECTION, SOLUTION INTRAVENOUS ONCE
Qty: 0 | Refills: 0 | Status: DISCONTINUED | OUTPATIENT
Start: 2017-12-26 | End: 2018-01-10

## 2017-12-22 RX ORDER — CARBOPLATIN 50 MG
608.7 VIAL (EA) INTRAVENOUS ONCE
Qty: 0 | Refills: 0 | Status: DISCONTINUED | OUTPATIENT
Start: 2017-12-26 | End: 2018-01-10

## 2017-12-22 RX ORDER — DEXAMETHASONE 0.5 MG/5ML
12 ELIXIR ORAL ONCE
Qty: 0 | Refills: 0 | Status: DISCONTINUED | OUTPATIENT
Start: 2017-12-26 | End: 2018-01-10

## 2017-12-22 RX ORDER — DIPHENHYDRAMINE HCL 50 MG
25 CAPSULE ORAL ONCE
Qty: 0 | Refills: 0 | Status: DISCONTINUED | OUTPATIENT
Start: 2017-12-26 | End: 2018-01-10

## 2017-12-22 RX ORDER — PACLITAXEL 6 MG/ML
329 INJECTION, SOLUTION, CONCENTRATE INTRAVENOUS ONCE
Qty: 0 | Refills: 0 | Status: DISCONTINUED | OUTPATIENT
Start: 2017-12-26 | End: 2018-01-10

## 2017-12-26 ENCOUNTER — OUTPATIENT (OUTPATIENT)
Dept: OUTPATIENT SERVICES | Facility: HOSPITAL | Age: 67
LOS: 1 days | End: 2017-12-26
Payer: COMMERCIAL

## 2017-12-26 ENCOUNTER — APPOINTMENT (OUTPATIENT)
Dept: INFUSION THERAPY | Facility: HOSPITAL | Age: 67
End: 2017-12-26

## 2017-12-26 DIAGNOSIS — C54.1 MALIGNANT NEOPLASM OF ENDOMETRIUM: ICD-10-CM

## 2017-12-26 PROCEDURE — 96375 TX/PRO/DX INJ NEW DRUG ADDON: CPT

## 2017-12-26 PROCEDURE — 96413 CHEMO IV INFUSION 1 HR: CPT

## 2017-12-26 PROCEDURE — 96415 CHEMO IV INFUSION ADDL HR: CPT

## 2017-12-26 PROCEDURE — 96417 CHEMO IV INFUS EACH ADDL SEQ: CPT

## 2018-01-03 DIAGNOSIS — R11.2 NAUSEA WITH VOMITING, UNSPECIFIED: ICD-10-CM

## 2018-01-10 ENCOUNTER — LABORATORY RESULT (OUTPATIENT)
Age: 68
End: 2018-01-10

## 2018-01-10 ENCOUNTER — APPOINTMENT (OUTPATIENT)
Dept: GYNECOLOGIC ONCOLOGY | Facility: CLINIC | Age: 68
End: 2018-01-10
Payer: COMMERCIAL

## 2018-01-10 VITALS
HEIGHT: 65 IN | HEART RATE: 90 BPM | OXYGEN SATURATION: 98 % | DIASTOLIC BLOOD PRESSURE: 70 MMHG | RESPIRATION RATE: 19 BRPM | WEIGHT: 187 LBS | BODY MASS INDEX: 31.16 KG/M2 | SYSTOLIC BLOOD PRESSURE: 165 MMHG

## 2018-01-10 PROCEDURE — 99214 OFFICE O/P EST MOD 30 MIN: CPT | Mod: 24

## 2018-01-11 LAB
ALBUMIN SERPL ELPH-MCNC: 4.5 G/DL
ALP BLD-CCNC: 73 U/L
ALT SERPL-CCNC: 35 U/L
ANION GAP SERPL CALC-SCNC: 16 MMOL/L
AST SERPL-CCNC: 35 U/L
BASOPHILS # BLD AUTO: 0 K/UL
BASOPHILS NFR BLD AUTO: 0 %
BILIRUB SERPL-MCNC: 0.2 MG/DL
BUN SERPL-MCNC: 22 MG/DL
CALCIUM SERPL-MCNC: 9.6 MG/DL
CANCER AG125 SERPL-ACNC: 34 U/ML
CHLORIDE SERPL-SCNC: 101 MMOL/L
CO2 SERPL-SCNC: 24 MMOL/L
CREAT SERPL-MCNC: 0.96 MG/DL
EOSINOPHIL # BLD AUTO: 0 K/UL
EOSINOPHIL NFR BLD AUTO: 0
HCT VFR BLD CALC: 38.9 %
HGB BLD-MCNC: 12.7 G/DL
LYMPHOCYTES # BLD AUTO: 1.16 K/UL
LYMPHOCYTES NFR BLD AUTO: 38.4 %
MAGNESIUM SERPL-MCNC: 1.8 MG/DL
MAN DIFF?: NORMAL
MCHC RBC-ENTMCNC: 30.5 PG
MCHC RBC-ENTMCNC: 32.6 GM/DL
MCV RBC AUTO: 93.3 FL
MONOCYTES # BLD AUTO: 0.7 K/UL
MONOCYTES NFR BLD AUTO: 23.2 %
NEUTROPHILS # BLD AUTO: 1.05 K/UL
NEUTROPHILS NFR BLD AUTO: 34.8 %
PLATELET # BLD AUTO: 291 K/UL
POTASSIUM SERPL-SCNC: 4.3 MMOL/L
PROT SERPL-MCNC: 7 G/DL
RBC # BLD: 4.17 M/UL
RBC # FLD: 15.8 %
SODIUM SERPL-SCNC: 141 MMOL/L
WBC # FLD AUTO: 3.02 K/UL

## 2018-01-12 RX ORDER — PACLITAXEL 6 MG/ML
336 INJECTION, SOLUTION, CONCENTRATE INTRAVENOUS ONCE
Qty: 0 | Refills: 0 | Status: DISCONTINUED | OUTPATIENT
Start: 2018-01-16 | End: 2018-01-31

## 2018-01-12 RX ORDER — PALONOSETRON HYDROCHLORIDE 0.25 MG/5ML
0.25 INJECTION, SOLUTION INTRAVENOUS ONCE
Qty: 0 | Refills: 0 | Status: DISCONTINUED | OUTPATIENT
Start: 2018-01-16 | End: 2018-01-31

## 2018-01-12 RX ORDER — FAMOTIDINE 10 MG/ML
20 INJECTION INTRAVENOUS ONCE
Qty: 0 | Refills: 0 | Status: DISCONTINUED | OUTPATIENT
Start: 2018-01-16 | End: 2018-01-31

## 2018-01-12 RX ORDER — CARBOPLATIN 50 MG
619.3 VIAL (EA) INTRAVENOUS ONCE
Qty: 0 | Refills: 0 | Status: DISCONTINUED | OUTPATIENT
Start: 2018-01-16 | End: 2018-01-31

## 2018-01-12 RX ORDER — DEXAMETHASONE 0.5 MG/5ML
12 ELIXIR ORAL ONCE
Qty: 0 | Refills: 0 | Status: DISCONTINUED | OUTPATIENT
Start: 2018-01-16 | End: 2018-01-31

## 2018-01-12 RX ORDER — DIPHENHYDRAMINE HCL 50 MG
25 CAPSULE ORAL ONCE
Qty: 0 | Refills: 0 | Status: DISCONTINUED | OUTPATIENT
Start: 2018-01-16 | End: 2018-01-31

## 2018-01-16 ENCOUNTER — OUTPATIENT (OUTPATIENT)
Dept: OUTPATIENT SERVICES | Facility: HOSPITAL | Age: 68
LOS: 1 days | End: 2018-01-16
Payer: COMMERCIAL

## 2018-01-16 ENCOUNTER — APPOINTMENT (OUTPATIENT)
Dept: INFUSION THERAPY | Facility: HOSPITAL | Age: 68
End: 2018-01-16

## 2018-01-16 DIAGNOSIS — C54.1 MALIGNANT NEOPLASM OF ENDOMETRIUM: ICD-10-CM

## 2018-01-16 LAB
BASOPHILS NFR BLD AUTO: 0.6 % — SIGNIFICANT CHANGE UP (ref 0–2)
HCT VFR BLD CALC: 37.6 % — SIGNIFICANT CHANGE UP (ref 34.5–45)
HGB BLD-MCNC: 13.1 G/DL — SIGNIFICANT CHANGE UP (ref 11.5–15.5)
LYMPHOCYTES # BLD AUTO: 32.1 % — SIGNIFICANT CHANGE UP (ref 13–44)
MCHC RBC-ENTMCNC: 30.7 PG — SIGNIFICANT CHANGE UP (ref 27–34)
MCHC RBC-ENTMCNC: 34.8 G/DL — SIGNIFICANT CHANGE UP (ref 32–36)
MCV RBC AUTO: 88.1 FL — SIGNIFICANT CHANGE UP (ref 80–100)
MONOCYTES NFR BLD AUTO: 12.9 % — SIGNIFICANT CHANGE UP (ref 2–14)
NEUTROPHILS NFR BLD AUTO: 54.2 % — SIGNIFICANT CHANGE UP (ref 43–77)
PLATELET # BLD AUTO: 166 K/UL — SIGNIFICANT CHANGE UP (ref 150–400)
RBC # BLD: 4.27 M/UL — SIGNIFICANT CHANGE UP (ref 3.8–5.2)
RBC # FLD: 15.8 % — SIGNIFICANT CHANGE UP (ref 10.3–16.9)
WBC # BLD: 5.3 K/UL — SIGNIFICANT CHANGE UP (ref 3.8–10.5)
WBC # FLD AUTO: 5.3 K/UL — SIGNIFICANT CHANGE UP (ref 3.8–10.5)

## 2018-01-16 PROCEDURE — 85025 COMPLETE CBC W/AUTO DIFF WBC: CPT

## 2018-01-16 PROCEDURE — 96413 CHEMO IV INFUSION 1 HR: CPT

## 2018-01-16 PROCEDURE — 96417 CHEMO IV INFUS EACH ADDL SEQ: CPT

## 2018-01-16 PROCEDURE — 96415 CHEMO IV INFUSION ADDL HR: CPT

## 2018-01-16 PROCEDURE — 36415 COLL VENOUS BLD VENIPUNCTURE: CPT

## 2018-01-16 PROCEDURE — 96375 TX/PRO/DX INJ NEW DRUG ADDON: CPT

## 2018-01-19 DIAGNOSIS — R11.2 NAUSEA WITH VOMITING, UNSPECIFIED: ICD-10-CM

## 2018-01-31 ENCOUNTER — APPOINTMENT (OUTPATIENT)
Dept: GYNECOLOGIC ONCOLOGY | Facility: CLINIC | Age: 68
End: 2018-01-31
Payer: COMMERCIAL

## 2018-01-31 PROCEDURE — 99214 OFFICE O/P EST MOD 30 MIN: CPT

## 2018-02-01 LAB
ALBUMIN SERPL ELPH-MCNC: 4.4 G/DL
ALP BLD-CCNC: 77 U/L
ALT SERPL-CCNC: 23 U/L
ANION GAP SERPL CALC-SCNC: 17 MMOL/L
AST SERPL-CCNC: 25 U/L
BASOPHILS # BLD AUTO: 0.01 K/UL
BASOPHILS NFR BLD AUTO: 0.3 %
BILIRUB SERPL-MCNC: 0.3 MG/DL
BUN SERPL-MCNC: 17 MG/DL
CALCIUM SERPL-MCNC: 9.7 MG/DL
CANCER AG125 SERPL-ACNC: 33 U/ML
CHLORIDE SERPL-SCNC: 101 MMOL/L
CO2 SERPL-SCNC: 25 MMOL/L
CREAT SERPL-MCNC: 1.02 MG/DL
EOSINOPHIL # BLD AUTO: 0.01 K/UL
EOSINOPHIL NFR BLD AUTO: 0.3 %
HCT VFR BLD CALC: 37 %
HGB BLD-MCNC: 11.9 G/DL
IMM GRANULOCYTES NFR BLD AUTO: 0.3 %
LYMPHOCYTES # BLD AUTO: 1.36 K/UL
LYMPHOCYTES NFR BLD AUTO: 45.3 %
MAGNESIUM SERPL-MCNC: 1.9 MG/DL
MAN DIFF?: NORMAL
MCHC RBC-ENTMCNC: 31.3 PG
MCHC RBC-ENTMCNC: 32.2 GM/DL
MCV RBC AUTO: 97.4 FL
MONOCYTES # BLD AUTO: 0.46 K/UL
MONOCYTES NFR BLD AUTO: 15.3 %
NEUTROPHILS # BLD AUTO: 1.15 K/UL
NEUTROPHILS NFR BLD AUTO: 38.5 %
PLATELET # BLD AUTO: 223 K/UL
POTASSIUM SERPL-SCNC: 4.6 MMOL/L
PROT SERPL-MCNC: 6.9 G/DL
RBC # BLD: 3.8 M/UL
RBC # FLD: 17 %
SODIUM SERPL-SCNC: 143 MMOL/L
WBC # FLD AUTO: 3 K/UL

## 2018-02-05 RX ORDER — PALONOSETRON HYDROCHLORIDE 0.25 MG/5ML
0.25 INJECTION, SOLUTION INTRAVENOUS ONCE
Qty: 0 | Refills: 0 | Status: DISCONTINUED | OUTPATIENT
Start: 2018-02-06 | End: 2018-02-21

## 2018-02-05 RX ORDER — DEXAMETHASONE 0.5 MG/5ML
12 ELIXIR ORAL ONCE
Qty: 0 | Refills: 0 | Status: DISCONTINUED | OUTPATIENT
Start: 2018-02-06 | End: 2018-02-21

## 2018-02-05 RX ORDER — FAMOTIDINE 10 MG/ML
20 INJECTION INTRAVENOUS ONCE
Qty: 0 | Refills: 0 | Status: DISCONTINUED | OUTPATIENT
Start: 2018-02-06 | End: 2018-02-21

## 2018-02-05 RX ORDER — DIPHENHYDRAMINE HCL 50 MG
25 CAPSULE ORAL ONCE
Qty: 0 | Refills: 0 | Status: DISCONTINUED | OUTPATIENT
Start: 2018-02-06 | End: 2018-02-21

## 2018-02-05 RX ORDER — PEGFILGRASTIM-CBQV 6 MG/.6ML
6 INJECTION, SOLUTION SUBCUTANEOUS ONCE
Qty: 0 | Refills: 0 | Status: DISCONTINUED | OUTPATIENT
Start: 2018-02-06 | End: 2018-02-21

## 2018-02-05 RX ORDER — CARBOPLATIN 50 MG
493 VIAL (EA) INTRAVENOUS ONCE
Qty: 0 | Refills: 0 | Status: DISCONTINUED | OUTPATIENT
Start: 2018-02-06 | End: 2018-02-21

## 2018-02-05 RX ORDER — DOCETAXEL 20 MG/ML
153 INJECTION, SOLUTION, CONCENTRATE INTRAVENOUS ONCE
Qty: 0 | Refills: 0 | Status: DISCONTINUED | OUTPATIENT
Start: 2018-02-06 | End: 2018-02-21

## 2018-02-06 ENCOUNTER — OUTPATIENT (OUTPATIENT)
Dept: OUTPATIENT SERVICES | Facility: HOSPITAL | Age: 68
LOS: 1 days | End: 2018-02-06
Payer: COMMERCIAL

## 2018-02-06 ENCOUNTER — APPOINTMENT (OUTPATIENT)
Dept: INFUSION THERAPY | Facility: HOSPITAL | Age: 68
End: 2018-02-06

## 2018-02-06 DIAGNOSIS — C54.1 MALIGNANT NEOPLASM OF ENDOMETRIUM: ICD-10-CM

## 2018-02-06 LAB
BASOPHILS NFR BLD AUTO: 0.2 % — SIGNIFICANT CHANGE UP (ref 0–2)
HCT VFR BLD CALC: 36.9 % — SIGNIFICANT CHANGE UP (ref 34.5–45)
HGB BLD-MCNC: 12.4 G/DL — SIGNIFICANT CHANGE UP (ref 11.5–15.5)
LYMPHOCYTES # BLD AUTO: 20.2 % — SIGNIFICANT CHANGE UP (ref 13–44)
MCHC RBC-ENTMCNC: 31.3 PG — SIGNIFICANT CHANGE UP (ref 27–34)
MCHC RBC-ENTMCNC: 33.6 G/DL — SIGNIFICANT CHANGE UP (ref 32–36)
MCV RBC AUTO: 93.2 FL — SIGNIFICANT CHANGE UP (ref 80–100)
MONOCYTES NFR BLD AUTO: 12.3 % — SIGNIFICANT CHANGE UP (ref 2–14)
NEUTROPHILS NFR BLD AUTO: 67.3 % — SIGNIFICANT CHANGE UP (ref 43–77)
PLATELET # BLD AUTO: 261 K/UL — SIGNIFICANT CHANGE UP (ref 150–400)
RBC # BLD: 3.96 M/UL — SIGNIFICANT CHANGE UP (ref 3.8–5.2)
RBC # FLD: 17.6 % — HIGH (ref 10.3–16.9)
WBC # BLD: 5.7 K/UL — SIGNIFICANT CHANGE UP (ref 3.8–10.5)
WBC # FLD AUTO: 5.7 K/UL — SIGNIFICANT CHANGE UP (ref 3.8–10.5)

## 2018-02-06 PROCEDURE — 96413 CHEMO IV INFUSION 1 HR: CPT

## 2018-02-06 PROCEDURE — 96377 APPLICATON ON-BODY INJECTOR: CPT

## 2018-02-06 PROCEDURE — 96417 CHEMO IV INFUS EACH ADDL SEQ: CPT

## 2018-02-06 PROCEDURE — 96375 TX/PRO/DX INJ NEW DRUG ADDON: CPT

## 2018-02-06 PROCEDURE — 36415 COLL VENOUS BLD VENIPUNCTURE: CPT

## 2018-02-14 DIAGNOSIS — R11.2 NAUSEA WITH VOMITING, UNSPECIFIED: ICD-10-CM

## 2018-02-16 ENCOUNTER — APPOINTMENT (OUTPATIENT)
Age: 68
End: 2018-02-16
Payer: COMMERCIAL

## 2018-02-16 DIAGNOSIS — Z86.59 PERSONAL HISTORY OF OTHER MENTAL AND BEHAVIORAL DISORDERS: ICD-10-CM

## 2018-02-16 DIAGNOSIS — Z87.19 PERSONAL HISTORY OF OTHER DISEASES OF THE DIGESTIVE SYSTEM: ICD-10-CM

## 2018-02-16 PROCEDURE — 77263 THER RADIOLOGY TX PLNG CPLX: CPT

## 2018-02-16 RX ORDER — DOCUSATE SODIUM 100 MG/1
100 CAPSULE ORAL
Qty: 60 | Refills: 0 | Status: DISCONTINUED | COMMUNITY
Start: 2017-10-13 | End: 2018-02-16

## 2018-02-16 RX ORDER — PROCHLORPERAZINE MALEATE 10 MG/1
10 TABLET ORAL EVERY 6 HOURS
Qty: 30 | Refills: 3 | Status: DISCONTINUED | COMMUNITY
Start: 2017-11-08 | End: 2018-02-16

## 2018-02-16 RX ORDER — IBUPROFEN 800 MG/1
800 TABLET, FILM COATED ORAL 3 TIMES DAILY
Qty: 30 | Refills: 3 | Status: DISCONTINUED | COMMUNITY
Start: 2017-10-13 | End: 2018-02-16

## 2018-02-16 RX ORDER — OXYCODONE AND ACETAMINOPHEN 5; 325 MG/1; MG/1
5-325 TABLET ORAL
Qty: 30 | Refills: 0 | Status: DISCONTINUED | COMMUNITY
Start: 2017-10-13 | End: 2018-02-16

## 2018-02-16 RX ORDER — ONDANSETRON 8 MG/1
8 TABLET ORAL EVERY 8 HOURS
Qty: 30 | Refills: 3 | Status: DISCONTINUED | COMMUNITY
Start: 2017-11-08 | End: 2018-02-16

## 2018-02-16 RX ORDER — DEXAMETHASONE 4 MG/1
4 TABLET ORAL
Qty: 30 | Refills: 3 | Status: DISCONTINUED | COMMUNITY
Start: 2017-11-08 | End: 2018-02-16

## 2018-02-20 PROBLEM — Z87.19 HISTORY OF GASTROESOPHAGEAL REFLUX (GERD): Status: RESOLVED | Noted: 2018-02-20 | Resolved: 2018-02-20

## 2018-02-22 ENCOUNTER — APPOINTMENT (OUTPATIENT)
Dept: GYNECOLOGIC ONCOLOGY | Facility: CLINIC | Age: 68
End: 2018-02-22
Payer: COMMERCIAL

## 2018-02-22 VITALS
TEMPERATURE: 99.9 F | OXYGEN SATURATION: 98 % | DIASTOLIC BLOOD PRESSURE: 82 MMHG | HEIGHT: 65 IN | SYSTOLIC BLOOD PRESSURE: 166 MMHG | BODY MASS INDEX: 32.65 KG/M2 | WEIGHT: 196 LBS | HEART RATE: 96 BPM

## 2018-02-22 PROCEDURE — 99214 OFFICE O/P EST MOD 30 MIN: CPT

## 2018-02-26 LAB
ALBUMIN SERPL ELPH-MCNC: 4.3 G/DL
ALP BLD-CCNC: 97 U/L
ALT SERPL-CCNC: 21 U/L
ANION GAP SERPL CALC-SCNC: 16 MMOL/L
AST SERPL-CCNC: 31 U/L
BASOPHILS # BLD AUTO: 0.03 K/UL
BASOPHILS NFR BLD AUTO: 0.3 %
BILIRUB SERPL-MCNC: 0.3 MG/DL
BUN SERPL-MCNC: 16 MG/DL
CALCIUM SERPL-MCNC: 9.5 MG/DL
CANCER AG125 SERPL-ACNC: 28 U/ML
CHLORIDE SERPL-SCNC: 103 MMOL/L
CO2 SERPL-SCNC: 23 MMOL/L
CREAT SERPL-MCNC: 1.02 MG/DL
EOSINOPHIL # BLD AUTO: 0 K/UL
EOSINOPHIL NFR BLD AUTO: 0 %
GLUCOSE SERPL-MCNC: 110 MG/DL
HCT VFR BLD CALC: 34.5 %
HGB BLD-MCNC: 11.3 G/DL
IMM GRANULOCYTES NFR BLD AUTO: 0.1 %
LYMPHOCYTES # BLD AUTO: 1.57 K/UL
LYMPHOCYTES NFR BLD AUTO: 16.4 %
MAGNESIUM SERPL-MCNC: 2.1 MG/DL
MAN DIFF?: NORMAL
MCHC RBC-ENTMCNC: 32.4 PG
MCHC RBC-ENTMCNC: 32.8 GM/DL
MCV RBC AUTO: 98.9 FL
MONOCYTES # BLD AUTO: 0.6 K/UL
MONOCYTES NFR BLD AUTO: 6.3 %
NEUTROPHILS # BLD AUTO: 7.36 K/UL
NEUTROPHILS NFR BLD AUTO: 76.9 %
PLATELET # BLD AUTO: 231 K/UL
POTASSIUM SERPL-SCNC: 4.6 MMOL/L
PROT SERPL-MCNC: 7.1 G/DL
RBC # BLD: 3.49 M/UL
RBC # FLD: 19.2 %
SODIUM SERPL-SCNC: 142 MMOL/L
WBC # FLD AUTO: 9.57 K/UL

## 2018-02-27 ENCOUNTER — APPOINTMENT (OUTPATIENT)
Dept: INFUSION THERAPY | Facility: HOSPITAL | Age: 68
End: 2018-02-27

## 2018-03-13 PROCEDURE — 77338 DESIGN MLC DEVICE FOR IMRT: CPT | Mod: 26

## 2018-03-13 PROCEDURE — 77301 RADIOTHERAPY DOSE PLAN IMRT: CPT | Mod: 26

## 2018-03-13 PROCEDURE — 77300 RADIATION THERAPY DOSE PLAN: CPT | Mod: 26

## 2018-03-19 PROCEDURE — 77387B: CUSTOM | Mod: 26

## 2018-03-20 PROCEDURE — 77387B: CUSTOM | Mod: 26

## 2018-03-22 VITALS
BODY MASS INDEX: 33.07 KG/M2 | OXYGEN SATURATION: 98 % | DIASTOLIC BLOOD PRESSURE: 74 MMHG | SYSTOLIC BLOOD PRESSURE: 159 MMHG | RESPIRATION RATE: 18 BRPM | WEIGHT: 198.7 LBS | HEART RATE: 92 BPM

## 2018-03-22 PROCEDURE — 77387B: CUSTOM | Mod: 26

## 2018-03-23 LAB
BASOPHILS # BLD AUTO: 0.02 K/UL
BASOPHILS NFR BLD AUTO: 0.4 %
EOSINOPHIL # BLD AUTO: 0.12 K/UL
EOSINOPHIL NFR BLD AUTO: 2.4 %
HCT VFR BLD CALC: 40.4 %
HGB BLD-MCNC: 13.5 G/DL
IMM GRANULOCYTES NFR BLD AUTO: 0 %
LYMPHOCYTES # BLD AUTO: 1.13 K/UL
LYMPHOCYTES NFR BLD AUTO: 22.2 %
MAN DIFF?: NORMAL
MCHC RBC-ENTMCNC: 33.4 GM/DL
MCHC RBC-ENTMCNC: 33.6 PG
MCV RBC AUTO: 100.5 FL
MONOCYTES # BLD AUTO: 0.3 K/UL
MONOCYTES NFR BLD AUTO: 5.9 %
NEUTROPHILS # BLD AUTO: 3.53 K/UL
NEUTROPHILS NFR BLD AUTO: 69.1 %
PLATELET # BLD AUTO: 248 K/UL
RBC # BLD: 4.02 M/UL
RBC # FLD: 13.3 %
WBC # FLD AUTO: 5.1 K/UL

## 2018-03-23 PROCEDURE — 77387B: CUSTOM | Mod: 26

## 2018-03-26 PROCEDURE — 77427 RADIATION TX MANAGEMENT X5: CPT

## 2018-03-26 PROCEDURE — 77387B: CUSTOM | Mod: 26

## 2018-03-27 PROCEDURE — 77387B: CUSTOM | Mod: 26

## 2018-03-28 VITALS
SYSTOLIC BLOOD PRESSURE: 137 MMHG | HEART RATE: 78 BPM | OXYGEN SATURATION: 100 % | WEIGHT: 201.2 LBS | RESPIRATION RATE: 18 BRPM | BODY MASS INDEX: 33.48 KG/M2 | DIASTOLIC BLOOD PRESSURE: 82 MMHG

## 2018-03-28 PROCEDURE — 77387B: CUSTOM | Mod: 26

## 2018-03-29 PROCEDURE — 77387B: CUSTOM | Mod: 26

## 2018-03-30 PROCEDURE — 77387B: CUSTOM | Mod: 26

## 2018-04-02 PROCEDURE — 77427 RADIATION TX MANAGEMENT X5: CPT

## 2018-04-02 PROCEDURE — 77387B: CUSTOM | Mod: 26

## 2018-04-03 PROCEDURE — 77387B: CUSTOM | Mod: 26

## 2018-04-04 VITALS
HEART RATE: 85 BPM | DIASTOLIC BLOOD PRESSURE: 81 MMHG | OXYGEN SATURATION: 99 % | RESPIRATION RATE: 18 BRPM | SYSTOLIC BLOOD PRESSURE: 142 MMHG

## 2018-04-04 VITALS — WEIGHT: 199 LBS | BODY MASS INDEX: 33.12 KG/M2

## 2018-04-04 VITALS — BODY MASS INDEX: 33.66 KG/M2 | HEIGHT: 65 IN | WEIGHT: 202 LBS

## 2018-04-04 PROCEDURE — 77387B: CUSTOM | Mod: 26

## 2018-04-05 PROCEDURE — 77387B: CUSTOM | Mod: 26

## 2018-04-06 LAB
BASOPHILS # BLD AUTO: 0 K/UL
BASOPHILS NFR BLD AUTO: 0 %
EOSINOPHIL # BLD AUTO: 0.05 K/UL
EOSINOPHIL NFR BLD AUTO: 1.9 %
HCT VFR BLD CALC: 38.5 %
HGB BLD-MCNC: 13.4 G/DL
IMM GRANULOCYTES NFR BLD AUTO: 0.4 %
LYMPHOCYTES # BLD AUTO: 0.39 K/UL
LYMPHOCYTES NFR BLD AUTO: 15 %
MAN DIFF?: NORMAL
MCHC RBC-ENTMCNC: 33.7 PG
MCHC RBC-ENTMCNC: 34.8 GM/DL
MCV RBC AUTO: 96.7 FL
MONOCYTES # BLD AUTO: 0.28 K/UL
MONOCYTES NFR BLD AUTO: 10.8 %
NEUTROPHILS # BLD AUTO: 1.87 K/UL
NEUTROPHILS NFR BLD AUTO: 71.9 %
PLATELET # BLD AUTO: 139 K/UL
RBC # BLD: 3.98 M/UL
RBC # FLD: 12.4 %
WBC # FLD AUTO: 2.6 K/UL

## 2018-04-06 PROCEDURE — 77387B: CUSTOM | Mod: 26

## 2018-04-09 PROCEDURE — 77387B: CUSTOM | Mod: 26

## 2018-04-09 PROCEDURE — 77427 RADIATION TX MANAGEMENT X5: CPT

## 2018-04-10 PROCEDURE — 77387B: CUSTOM | Mod: 26

## 2018-04-11 VITALS
OXYGEN SATURATION: 99 % | BODY MASS INDEX: 32.78 KG/M2 | SYSTOLIC BLOOD PRESSURE: 177 MMHG | RESPIRATION RATE: 18 BRPM | DIASTOLIC BLOOD PRESSURE: 83 MMHG | WEIGHT: 197 LBS | HEART RATE: 80 BPM

## 2018-04-11 VITALS — BODY MASS INDEX: 32.68 KG/M2 | WEIGHT: 196.4 LBS

## 2018-04-11 PROCEDURE — 77387B: CUSTOM | Mod: 26

## 2018-04-12 PROCEDURE — 77387B: CUSTOM | Mod: 26

## 2018-04-13 LAB
BASOPHILS # BLD AUTO: 0.02 K/UL
BASOPHILS NFR BLD AUTO: 0.8 %
EOSINOPHIL # BLD AUTO: 0.05 K/UL
EOSINOPHIL NFR BLD AUTO: 1.9 %
HCT VFR BLD CALC: 37.9 %
HGB BLD-MCNC: 12.7 G/DL
IMM GRANULOCYTES NFR BLD AUTO: 0 %
LYMPHOCYTES # BLD AUTO: 0.29 K/UL
LYMPHOCYTES NFR BLD AUTO: 11.2 %
MAN DIFF?: NORMAL
MCHC RBC-ENTMCNC: 33.1 PG
MCHC RBC-ENTMCNC: 33.5 GM/DL
MCV RBC AUTO: 98.7 FL
MONOCYTES # BLD AUTO: 0.26 K/UL
MONOCYTES NFR BLD AUTO: 10 %
NEUTROPHILS # BLD AUTO: 1.97 K/UL
NEUTROPHILS NFR BLD AUTO: 76.1 %
PLATELET # BLD AUTO: 156 K/UL
RBC # BLD: 3.84 M/UL
RBC # FLD: 12.3 %
WBC # FLD AUTO: 2.59 K/UL

## 2018-04-13 PROCEDURE — 77387B: CUSTOM | Mod: 26

## 2018-04-16 PROCEDURE — 77387B: CUSTOM | Mod: 26

## 2018-04-16 PROCEDURE — 77427 RADIATION TX MANAGEMENT X5: CPT

## 2018-04-17 PROCEDURE — 77387B: CUSTOM | Mod: 26

## 2018-04-18 ENCOUNTER — LABORATORY RESULT (OUTPATIENT)
Age: 68
End: 2018-04-18

## 2018-04-18 VITALS
HEART RATE: 74 BPM | RESPIRATION RATE: 18 BRPM | OXYGEN SATURATION: 98 % | SYSTOLIC BLOOD PRESSURE: 138 MMHG | WEIGHT: 192.7 LBS | DIASTOLIC BLOOD PRESSURE: 73 MMHG | BODY MASS INDEX: 32.07 KG/M2

## 2018-04-18 LAB
BASOPHILS # BLD AUTO: 0 K/UL
BASOPHILS NFR BLD AUTO: 0 %
EOSINOPHIL # BLD AUTO: 0.04 K/UL
EOSINOPHIL NFR BLD AUTO: 2.2 %
HCT VFR BLD CALC: 36.2 %
HGB BLD-MCNC: 12.3 G/DL
IMM GRANULOCYTES NFR BLD AUTO: 0 %
LYMPHOCYTES # BLD AUTO: 0.18 K/UL
LYMPHOCYTES NFR BLD AUTO: 9.9 %
MAN DIFF?: NORMAL
MCHC RBC-ENTMCNC: 32.4 PG
MCHC RBC-ENTMCNC: 34 GM/DL
MCV RBC AUTO: 95.3 FL
MONOCYTES # BLD AUTO: 0.17 K/UL
MONOCYTES NFR BLD AUTO: 9.4 %
NEUTROPHILS # BLD AUTO: 1.42 K/UL
NEUTROPHILS NFR BLD AUTO: 78.5 %
PLATELET # BLD AUTO: 160 K/UL
RBC # BLD: 3.8 M/UL
RBC # FLD: 12.4 %
WBC # FLD AUTO: 1.81 K/UL

## 2018-04-18 PROCEDURE — 77387B: CUSTOM | Mod: 26

## 2018-04-19 PROCEDURE — 77387B: CUSTOM | Mod: 26

## 2018-04-20 PROCEDURE — 77387B: CUSTOM | Mod: 26

## 2018-04-23 PROCEDURE — 77387B: CUSTOM | Mod: 26

## 2018-04-23 PROCEDURE — 77427 RADIATION TX MANAGEMENT X5: CPT

## 2018-04-24 PROCEDURE — 77387B: CUSTOM | Mod: 26

## 2018-04-24 PROCEDURE — 77290 THER RAD SIMULAJ FIELD CPLX: CPT | Mod: 26

## 2018-04-24 PROCEDURE — 77334 RADIATION TREATMENT AID(S): CPT | Mod: 26

## 2018-04-24 PROCEDURE — 77316 BRACHYTX ISODOSE PLAN SIMPLE: CPT | Mod: 26

## 2018-04-25 VITALS
SYSTOLIC BLOOD PRESSURE: 121 MMHG | DIASTOLIC BLOOD PRESSURE: 71 MMHG | HEART RATE: 73 BPM | OXYGEN SATURATION: 98 % | RESPIRATION RATE: 18 BRPM

## 2018-04-25 PROCEDURE — 77387B: CUSTOM | Mod: 26

## 2018-04-26 PROCEDURE — 77387B: CUSTOM | Mod: 26

## 2018-04-27 LAB
BASOPHILS # BLD AUTO: 0.01 K/UL
BASOPHILS NFR BLD AUTO: 0.4 %
EOSINOPHIL # BLD AUTO: 0.06 K/UL
EOSINOPHIL NFR BLD AUTO: 2.4 %
HCT VFR BLD CALC: 36.7 %
HGB BLD-MCNC: 12.1 G/DL
IMM GRANULOCYTES NFR BLD AUTO: 0.4 %
LYMPHOCYTES # BLD AUTO: 0.23 K/UL
LYMPHOCYTES NFR BLD AUTO: 9.1 %
MAN DIFF?: NORMAL
MCHC RBC-ENTMCNC: 32.1 PG
MCHC RBC-ENTMCNC: 33 GM/DL
MCV RBC AUTO: 97.3 FL
MONOCYTES # BLD AUTO: 0.25 K/UL
MONOCYTES NFR BLD AUTO: 9.9 %
NEUTROPHILS # BLD AUTO: 1.97 K/UL
NEUTROPHILS NFR BLD AUTO: 77.8 %
PLATELET # BLD AUTO: 191 K/UL
RBC # BLD: 3.77 M/UL
RBC # FLD: 12.3 %
WBC # FLD AUTO: 2.53 K/UL

## 2018-05-03 PROCEDURE — 77332 RADIATION TREATMENT AID(S): CPT | Mod: 26

## 2018-05-03 PROCEDURE — 77770 HDR RDNCL NTRSTL/ICAV BRCHTX: CPT | Mod: 26

## 2018-05-03 PROCEDURE — 57156 INS VAG BRACHYTX DEVICE: CPT

## 2018-05-10 PROCEDURE — 57156 INS VAG BRACHYTX DEVICE: CPT

## 2018-05-10 PROCEDURE — 77332 RADIATION TREATMENT AID(S): CPT | Mod: 26

## 2018-05-10 PROCEDURE — 77770 HDR RDNCL NTRSTL/ICAV BRCHTX: CPT | Mod: 26

## 2018-06-13 ENCOUNTER — APPOINTMENT (OUTPATIENT)
Age: 68
End: 2018-06-13
Payer: COMMERCIAL

## 2018-06-13 ENCOUNTER — APPOINTMENT (OUTPATIENT)
Dept: GYNECOLOGIC ONCOLOGY | Facility: CLINIC | Age: 68
End: 2018-06-13
Payer: COMMERCIAL

## 2018-06-13 VITALS
RESPIRATION RATE: 18 BRPM | SYSTOLIC BLOOD PRESSURE: 142 MMHG | WEIGHT: 190.1 LBS | OXYGEN SATURATION: 100 % | DIASTOLIC BLOOD PRESSURE: 85 MMHG | HEART RATE: 91 BPM | BODY MASS INDEX: 31.63 KG/M2

## 2018-06-13 VITALS
HEART RATE: 92 BPM | WEIGHT: 188.5 LBS | OXYGEN SATURATION: 97 % | DIASTOLIC BLOOD PRESSURE: 81 MMHG | BODY MASS INDEX: 31.4 KG/M2 | SYSTOLIC BLOOD PRESSURE: 137 MMHG | HEIGHT: 65 IN

## 2018-06-13 PROCEDURE — 99214 OFFICE O/P EST MOD 30 MIN: CPT

## 2018-06-13 PROCEDURE — 99024 POSTOP FOLLOW-UP VISIT: CPT

## 2018-06-13 PROCEDURE — 36415 COLL VENOUS BLD VENIPUNCTURE: CPT

## 2018-06-14 LAB
ALBUMIN SERPL ELPH-MCNC: 4.7 G/DL
ALP BLD-CCNC: 100 U/L
ALT SERPL-CCNC: 23 U/L
ANION GAP SERPL CALC-SCNC: 19 MMOL/L
AST SERPL-CCNC: 26 U/L
BASOPHILS # BLD AUTO: 0.02 K/UL
BASOPHILS NFR BLD AUTO: 0.5 %
BILIRUB SERPL-MCNC: 0.4 MG/DL
BUN SERPL-MCNC: 28 MG/DL
CALCIUM SERPL-MCNC: 9.7 MG/DL
CANCER AG125 SERPL-ACNC: 27 U/ML
CHLORIDE SERPL-SCNC: 104 MMOL/L
CO2 SERPL-SCNC: 20 MMOL/L
CREAT SERPL-MCNC: 1.08 MG/DL
EOSINOPHIL # BLD AUTO: 0.09 K/UL
EOSINOPHIL NFR BLD AUTO: 2.1 %
HCT VFR BLD CALC: 39.8 %
HGB BLD-MCNC: 13.2 G/DL
IMM GRANULOCYTES NFR BLD AUTO: 0.2 %
LYMPHOCYTES # BLD AUTO: 0.9 K/UL
LYMPHOCYTES NFR BLD AUTO: 21.4 %
MAN DIFF?: NORMAL
MCHC RBC-ENTMCNC: 31.9 PG
MCHC RBC-ENTMCNC: 33.2 GM/DL
MCV RBC AUTO: 96.1 FL
MONOCYTES # BLD AUTO: 0.34 K/UL
MONOCYTES NFR BLD AUTO: 8.1 %
NEUTROPHILS # BLD AUTO: 2.84 K/UL
NEUTROPHILS NFR BLD AUTO: 67.7 %
PLATELET # BLD AUTO: 197 K/UL
POTASSIUM SERPL-SCNC: 4.9 MMOL/L
PROT SERPL-MCNC: 7.3 G/DL
RBC # BLD: 4.14 M/UL
RBC # FLD: 14.1 %
SODIUM SERPL-SCNC: 143 MMOL/L
WBC # FLD AUTO: 4.2 K/UL

## 2018-07-23 PROBLEM — Z85.828 HISTORY OF BASAL CELL CARCINOMA: Status: RESOLVED | Noted: 2017-10-11 | Resolved: 2018-07-23

## 2018-09-17 ENCOUNTER — APPOINTMENT (OUTPATIENT)
Dept: GYNECOLOGIC ONCOLOGY | Facility: CLINIC | Age: 68
End: 2018-09-17
Payer: COMMERCIAL

## 2018-09-17 VITALS
DIASTOLIC BLOOD PRESSURE: 86 MMHG | HEIGHT: 65 IN | BODY MASS INDEX: 31.32 KG/M2 | SYSTOLIC BLOOD PRESSURE: 146 MMHG | HEART RATE: 94 BPM | WEIGHT: 188 LBS | OXYGEN SATURATION: 98 %

## 2018-09-17 PROCEDURE — 36415 COLL VENOUS BLD VENIPUNCTURE: CPT

## 2018-09-17 PROCEDURE — 99214 OFFICE O/P EST MOD 30 MIN: CPT

## 2018-09-17 RX ORDER — ALPRAZOLAM 2 MG/1
TABLET ORAL
Refills: 0 | Status: COMPLETED | COMMUNITY
End: 2018-09-17

## 2018-09-17 RX ORDER — OMEPRAZOLE MAGNESIUM 10 MG/1
10 GRANULE, DELAYED RELEASE ORAL
Refills: 0 | Status: COMPLETED | COMMUNITY
Start: 2018-02-16 | End: 2018-09-17

## 2018-09-18 LAB
ALBUMIN SERPL ELPH-MCNC: 4.8 G/DL
ALP BLD-CCNC: 113 U/L
ALT SERPL-CCNC: 24 U/L
ANION GAP SERPL CALC-SCNC: 17 MMOL/L
AST SERPL-CCNC: 28 U/L
BASOPHILS # BLD AUTO: 0.03 K/UL
BASOPHILS NFR BLD AUTO: 0.6 %
BILIRUB SERPL-MCNC: 0.3 MG/DL
BUN SERPL-MCNC: 16 MG/DL
CALCIUM SERPL-MCNC: 9.4 MG/DL
CANCER AG125 SERPL-ACNC: 58 U/ML
CHLORIDE SERPL-SCNC: 103 MMOL/L
CO2 SERPL-SCNC: 22 MMOL/L
CREAT SERPL-MCNC: 1.05 MG/DL
EOSINOPHIL # BLD AUTO: 0.18 K/UL
EOSINOPHIL NFR BLD AUTO: 3.5 %
HCT VFR BLD CALC: 42.7 %
HGB BLD-MCNC: 13.5 G/DL
IMM GRANULOCYTES NFR BLD AUTO: 0.2 %
LYMPHOCYTES # BLD AUTO: 1.12 K/UL
LYMPHOCYTES NFR BLD AUTO: 21.5 %
MAN DIFF?: NORMAL
MCHC RBC-ENTMCNC: 30.8 PG
MCHC RBC-ENTMCNC: 31.6 GM/DL
MCV RBC AUTO: 97.5 FL
MONOCYTES # BLD AUTO: 0.38 K/UL
MONOCYTES NFR BLD AUTO: 7.3 %
NEUTROPHILS # BLD AUTO: 3.48 K/UL
NEUTROPHILS NFR BLD AUTO: 66.9 %
PLATELET # BLD AUTO: 214 K/UL
POTASSIUM SERPL-SCNC: 4.5 MMOL/L
PROT SERPL-MCNC: 7.5 G/DL
RBC # BLD: 4.38 M/UL
RBC # FLD: 12.6 %
SODIUM SERPL-SCNC: 142 MMOL/L
WBC # FLD AUTO: 5.2 K/UL

## 2018-09-25 ENCOUNTER — OUTPATIENT (OUTPATIENT)
Dept: OUTPATIENT SERVICES | Facility: HOSPITAL | Age: 68
LOS: 1 days | End: 2018-09-25
Payer: COMMERCIAL

## 2018-09-25 ENCOUNTER — APPOINTMENT (OUTPATIENT)
Dept: CT IMAGING | Facility: HOSPITAL | Age: 68
End: 2018-09-25
Payer: COMMERCIAL

## 2018-09-25 PROCEDURE — 74177 CT ABD & PELVIS W/CONTRAST: CPT

## 2018-09-25 PROCEDURE — 74177 CT ABD & PELVIS W/CONTRAST: CPT | Mod: 26

## 2018-10-09 ENCOUNTER — OUTPATIENT (OUTPATIENT)
Dept: OUTPATIENT SERVICES | Facility: HOSPITAL | Age: 68
LOS: 1 days | End: 2018-10-09
Payer: COMMERCIAL

## 2018-10-09 PROCEDURE — A9552: CPT

## 2018-10-09 PROCEDURE — 82962 GLUCOSE BLOOD TEST: CPT

## 2018-10-09 PROCEDURE — 78815 PET IMAGE W/CT SKULL-THIGH: CPT | Mod: 26

## 2018-10-09 PROCEDURE — 78815 PET IMAGE W/CT SKULL-THIGH: CPT

## 2018-10-10 ENCOUNTER — APPOINTMENT (OUTPATIENT)
Dept: RADIATION ONCOLOGY | Facility: CLINIC | Age: 68
End: 2018-10-10
Payer: COMMERCIAL

## 2018-10-10 VITALS
DIASTOLIC BLOOD PRESSURE: 87 MMHG | SYSTOLIC BLOOD PRESSURE: 147 MMHG | HEIGHT: 65 IN | RESPIRATION RATE: 18 BRPM | WEIGHT: 187 LBS | OXYGEN SATURATION: 97 % | HEART RATE: 80 BPM | BODY MASS INDEX: 31.16 KG/M2

## 2018-10-10 PROCEDURE — 99214 OFFICE O/P EST MOD 30 MIN: CPT

## 2018-10-10 NOTE — VITALS
[Least Pain Intensity: 0/10] : 0/10 [90: Able to carry normal activity; minor signs or symptoms of disease.] : 90: Able to carry normal activity; minor signs or symptoms of disease.  [ECOG Performance Status: 1 - Restricted in physically strenuous activity but ambulatory and able to carry out work of a light or sedentary nature] : Performance Status: 1 - Restricted in physically strenuous activity but ambulatory and able to carry out work of a light or sedentary nature, e.g., light house work, office work [Date: ____________] : Patient's last distress assessment performed on [unfilled]. [4 - Distress Level] : Distress Level: 4 [Maximal Pain Intensity: 0/10] : 0/10

## 2018-10-11 NOTE — REVIEW OF SYSTEMS
[Fatigue] : fatigue [Anxiety] : anxiety [Negative] : Heme/Lymph [Hematuria: Grade 0] : Hematuria: Grade 0 [Urinary Urgency: Grade 0] : Urinary Urgency: Grade 0 [Urinary Frequency: Grade 0] : Urinary Frequency: Grade 0 [Alopecia: Grade 2 - Hair loss of >=50% normal for that individual that is readily apparent to others; a wig or hair piece is necessary if the patient desires to completely camouflage the hair loss; associated with psychosocial impact] : Alopecia: Grade 2 - Hair loss of >=50% normal for that individual that is readily apparent to others; a wig or hair piece is necessary if the patient desires to completely camouflage the hair loss; associated with psychosocial impact [Dermatitis Radiation: Grade 0] : Dermatitis Radiation: Grade 0 [Constipation: Grade 0] : Constipation: Grade 0 [Diarrhea: Grade 0] : Diarrhea: Grade 0 [Fecal Incontinence: Grade 0] : Fecal Incontinence: Grade 0 [Fatigue: Grade 0] : Fatigue: Grade 0 [Night Sweats] : no night sweats [Abdominal Pain] : no abdominal pain [Constipation] : no constipation [Diarrhea] : no diarrhea [Nocturia] : no nocturia [Vaginal Discharge] : no vaginal discharge [Dysmenorrhea/Abn Vaginal Bleeding] : no dysmenorrhea/abnormal vaginal bleeding [Skin Rash] : no skin rash [FreeTextEntry2] : fatigue improving [FreeTextEntry9] : pain to bilateral lower extremities [de-identified] : neuropathy post chemo/ numbness and tingling to BLE

## 2018-10-11 NOTE — PHYSICAL EXAM
[General Appearance - Well Developed] : well developed [General Appearance - Alert] : alert [General Appearance - In No Acute Distress] : in no acute distress [Sclera] : the sclera and conjunctiva were normal [Extraocular Movements] : extraocular movements were intact [Outer Ear] : the ears and nose were normal in appearance [Hearing Threshold Finger Rub Not [place]] : hearing was normal [Heart Sounds] : normal S1 and S2 [Bowel Sounds] : normal bowel sounds [Abdomen Soft] : soft [Normal] : normal skin color and pigmentation and no rash [No Focal Deficits] : no focal deficits [Oriented To Time, Place, And Person] : oriented to person, place, and time [Heart Rate And Rhythm] : heart rate and rhythm were normal [Abdomen Tenderness] : non-tender [de-identified] : Patient declined exam.  [de-identified] : anxious

## 2018-10-11 NOTE — HISTORY OF PRESENT ILLNESS
[FreeTextEntry1] : Ms. Shetty completed 5040 cGy to the Pelvic/ PA nodes, followed by 1200 cGy of vaginal brachytherapy boost  from 3/19/18-5/10/18 for a malignant neoplasm of the uterus. She received chemo with Dr. Manzano and declined final cycle due to extreme fatigue and neuropathy. She tolerated treatment well with intermittent diarrhea and urinary symptoms near the end of treatment.\par \par 10/10/18- FOLLOW UP\par Ms. Shetty returns for routine follow up. At her previous visit on 6/13/18, she was doing well; plan was to continue follow up with Dr. Urbina, who she last saw on 9/17/18. Dr. Urbina advised her to start Neurontin for the neuropathy, but she has not yet done so. Of note, her CA-125 was elevated to 58 on 9/17/18. Patient reports she had  PET/ CT scan yesterday. \par \par Today, she reports she feels well. Denies fever, chills, myalgia, fatigue. She denies hematuria, blood in stool, diarrhea, constipation. She notes some urinary "dribbling" at times a few minutes after she urinates. Denies any other  issues to include dysuria, frequency.  Denies vaginal discharge, dryness,  or bleeding. She has not yet started using vaginal dilator, but notes that Dr. Urbina did a vaginal exam on 9/17/18 and noted some irritation. Continues to report numbness and tingling to BLE; states she may start taking Gabapentin.\par \par \par 6/13/18- PTE\par Ms. Shetty completed 5040 cGy to the Pelvic/ PA nodes, followed by 1200 cGy of vaginal brachytherapy boost  from 3/19/18-5/10/18 for a malignant neoplasm of the uterus. She received chemo with Dr. Manzano and declined final cycle due to extreme fatigue and neuropathy. She tolerated treatment well with intermittent diarrhea and urinary symptoms near the end of treatment.\par \par Today she feels well. Energy levels continue to improve. Still with some pain to bilateral LE muscles on movement. Slight irritation to her external vaginal lip s when she uses different soaps, although no visible irritation. \par She is happy to be working 5 days per week, 5 hours a day. \par Alternating diarrhea and constipation.\par \par Oncologic History\par Ms. Jennifer Shetty presents today for consideration for radiation therapy for endometrial cancer. \par \par She was referred to Dr. Urbina initially in October 2017 for a new diagnosis of clear cell carcinoma. \par \par She began experiencing sylvain colored discharge in February 2017. She believed at the time this was due to fibroids. At that time she additionally was experienceing bilateral lower pelvis discomfort, and lower mid back pain.  She had a Thickened endometrium on ultrasound. We do not have the results of this original ultrasound. \par \par She underwent a D&C on 10/5/17. Pathology from this revealed fragments of clear cell carcinoma in the endometrial cutterage.  was 108 at that time. \par \par CT chest abdomen pelvis on 10/14/17 showed findings of endometrial carcinoma extending into the posterior uterine wall, negative for adenopathy or metastatic disease. \par \par She underwent hysterectomy on 10/26/17. Omental resection was negative for metastatic carcinoma. The uterus, cervix, bilateral tubes, and ovaries, total abdominal hysterectomy, and bilateral salpingo-oophrectomy revealed clear cell carcinoma, poorly differentiated. Carcinoma spanned 16 cm, arising from the posterior endometrium, invading 7 cm of a 7,5 cm thick myometrium with involvement of the cervical stroma. There was extensive LVI. All margins were negativeThe uninvolved myometrium is inactive and the myometrium showed adenomsis. Histologically unremarkable bilateral fallopian tubes and ovaries. \par There was metastatic caricnoma to 1/4 lymph nodes, No extranodal extensionw as identified. \par There was metastatic carcinoma to 1/2 lymph nodes to the right para-aortic area. No extranodal extension is identified. \par There was metastatic carcinoma to the 7/8 lymph nodes to the left para-aortic area. \par Five lymph nodes to the left pelvis were negative for metastatic carcinoma. \par Pelvic washings were negative. \par \par She then started treatment with carbo/taxol with Dr. Urbina. She has compelted 5/6 treatments. She notes she is considering stopping treatment early. She has had very severe fatigue during the last two cycles of chemo. She additionally has numbness/tingling to her bilateral feet, and hands, as well as the tip of her tongue and nose. \par \par  during treatment has trended down from 108 on 10/11/17 to 33 on 1/31/18.\par \par Today she notes fatigue. Her appetite remains good. She has intermittent chills after chemo. As mentioned above, she has numbness and tingling to her bilateral feet, and hands, as well as the tip of her tongue and nose. Since surgery she has had some numbness/disturbed sensation to her bilateral upper thighs. She notes she is anxious. She has intermittent palpitations, as well as intermittent SOB, which has been long standing. She notes her hand and feet are swollen bilaterally, and has some floaters in her vision. She has a bitter taste in her mouth. 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 \par \par \par \par

## 2018-10-11 NOTE — DISEASE MANAGEMENT
[Pathological] : TNM Stage: p [IIIC] : IIIC [FreeTextEntry4] : clear cell endometrial [TTNM] : 1b [NTNM] : 2a [MTNM] : 0

## 2019-01-11 ENCOUNTER — APPOINTMENT (OUTPATIENT)
Dept: GYNECOLOGIC ONCOLOGY | Facility: CLINIC | Age: 69
End: 2019-01-11
Payer: COMMERCIAL

## 2019-01-11 VITALS
OXYGEN SATURATION: 98 % | BODY MASS INDEX: 27.36 KG/M2 | DIASTOLIC BLOOD PRESSURE: 84 MMHG | HEIGHT: 70.5 IN | SYSTOLIC BLOOD PRESSURE: 149 MMHG | WEIGHT: 193.25 LBS | HEART RATE: 75 BPM

## 2019-01-11 PROCEDURE — 36415 COLL VENOUS BLD VENIPUNCTURE: CPT

## 2019-01-11 PROCEDURE — 99214 OFFICE O/P EST MOD 30 MIN: CPT

## 2019-01-14 LAB
ALBUMIN SERPL ELPH-MCNC: 4.7 G/DL
ALP BLD-CCNC: 110 U/L
ALT SERPL-CCNC: 17 U/L
ANION GAP SERPL CALC-SCNC: 13 MMOL/L
AST SERPL-CCNC: 20 U/L
BASOPHILS # BLD AUTO: 0.02 K/UL
BASOPHILS NFR BLD AUTO: 0.5 %
BILIRUB SERPL-MCNC: 0.3 MG/DL
BUN SERPL-MCNC: 20 MG/DL
CALCIUM SERPL-MCNC: 9.4 MG/DL
CANCER AG125 SERPL-ACNC: 47 U/ML
CHLORIDE SERPL-SCNC: 106 MMOL/L
CO2 SERPL-SCNC: 21 MMOL/L
CREAT SERPL-MCNC: 1.04 MG/DL
EOSINOPHIL # BLD AUTO: 0.11 K/UL
EOSINOPHIL NFR BLD AUTO: 2.6 %
HCT VFR BLD CALC: 42.9 %
HGB BLD-MCNC: 13.5 G/DL
IMM GRANULOCYTES NFR BLD AUTO: 0.2 %
LYMPHOCYTES # BLD AUTO: 1.05 K/UL
LYMPHOCYTES NFR BLD AUTO: 24.7 %
MAN DIFF?: NORMAL
MCHC RBC-ENTMCNC: 30.5 PG
MCHC RBC-ENTMCNC: 31.5 GM/DL
MCV RBC AUTO: 97.1 FL
MONOCYTES # BLD AUTO: 0.28 K/UL
MONOCYTES NFR BLD AUTO: 6.6 %
NEUTROPHILS # BLD AUTO: 2.78 K/UL
NEUTROPHILS NFR BLD AUTO: 65.4 %
PLATELET # BLD AUTO: 233 K/UL
POTASSIUM SERPL-SCNC: 4.7 MMOL/L
PROT SERPL-MCNC: 7.1 G/DL
RBC # BLD: 4.42 M/UL
RBC # FLD: 13.3 %
SODIUM SERPL-SCNC: 140 MMOL/L
WBC # FLD AUTO: 4.25 K/UL

## 2019-01-31 NOTE — PAST MEDICAL HISTORY
[Perimenopausal] : The patient is perimenopausal [Menarche Age ____] : age at menarche was [unfilled] [Menopause Age____] : age at menopause was [unfilled] [Approximately ___] : the LMP was approximately [unfilled] [Total Preg ___] : G[unfilled] [Abortions ___] : Abortions:[unfilled] [Living ___] : Living: [unfilled] [FreeTextEntry2] : known uterine myomas

## 2019-01-31 NOTE — PHYSICAL EXAM
[Absent] : Adnexa(ae): Absent [Abnormal] : Anus, perineum, and rectum: Abnormal [External Hemorrhoid] : were present [Normal] : Recto-Vaginal Exam: Normal [Restricted in physically strenuous activity but ambulatory and able to carry out work of a light or sedentary nature] : Status 1- Restricted in physically strenuous activity but ambulatory and able to carry out work of a light or sedentary nature, e.g., light house work, office work [Tender, Swollen] : nontender, non-swollen [de-identified] : healing midline vertical incision [de-identified] : radiation changes, erythema, pettichiae

## 2019-01-31 NOTE — DISCUSSION/SUMMARY
[FreeTextEntry1] : Surveillance visit \par \par -Pt is clinically JEREMIAS\par -Reviewed expectations for recovery and symptom management\par -Continue PT\par -Start Neurontin\par -epsom salt baths\par -Pt verbalized understanding\par -PT will follow-up in 3 months \par

## 2019-01-31 NOTE — REVIEW OF SYSTEMS
[Negative] : Respiratory [Neuropathy] : neuropathy [Incontinence] : incontinence [Muscle Weakness] : muscle weakness [FreeTextEntry2] : toes and feel [FreeTextEntry4] : vaginal dryness and sensitive, has leakage of a little urine after she urinates.   [de-identified] : loose stools. hemorrhoids. [de-identified] : OT and PT for a few months and she has a lot of pain with this stretching in the sides of thigh.  It is bothering her to do her job.

## 2019-01-31 NOTE — HISTORY OF PRESENT ILLNESS
[FreeTextEntry1] : Problem\par 1)  Stage IIIC2, Clear Cell Carcinoma of the endometrium\par \par \par Previous Therapy\par 1)D&C hysteroscopy EMB 10/3/17\par    a)Clear cell carcinoma \par 2) EMY, BSO, omentectomy 10/17/17\par    a)Omentum negative \par    b) Uterus, cervix, b/l tubes, ovaries clear cell carcinoma, poorly differentiated\par    c)Right pelvic LN 1/4\par    d) Right para aortic LN 1/2\par    e) Left para aortic LN 7/8\par    f) Left pelvis LN 0/5\par 3) Adjuvant carboplatin and paclitaxel x1, initiated 11/14/2017, cycle # 6 refused by patient\par 4)Pt completed EBRT on 4/26/2018\par 5)CTAP 9/25/18\par    a)Interval development of mild left hydroureter and mild ureteritis at the left UVJ, possibly secondary to radiation change \par 6)PET 10/9/18\par    a)No evidence of disease recurrence \par    b)No significant hydroureteronephrosis appreciated this sturdy. There is however decreased excretion of FDG in the left ureter which may indicate a mild component of obstruction, corresponding to mild left hydroureter and distal ureteritis seen on CT from 9/25/18 \par \par Pt presents today for third surveillance visit.  Looses some urine when she sits. Still has loose stool 3/4 times a day. Also has pain and sensitivity in the hips. \par \par Mammogram 6/2018 -Birads 2\par Bone Density 7/2018 - WDL\par Colonoscopy 10 years ago - due now\par Bone Density - 3 years ago.

## 2019-04-08 ENCOUNTER — APPOINTMENT (OUTPATIENT)
Dept: GYNECOLOGIC ONCOLOGY | Facility: CLINIC | Age: 69
End: 2019-04-08
Payer: COMMERCIAL

## 2019-04-08 VITALS
WEIGHT: 187.5 LBS | HEIGHT: 70 IN | BODY MASS INDEX: 26.84 KG/M2 | HEART RATE: 78 BPM | OXYGEN SATURATION: 96 % | SYSTOLIC BLOOD PRESSURE: 130 MMHG | DIASTOLIC BLOOD PRESSURE: 81 MMHG

## 2019-04-08 PROCEDURE — 36415 COLL VENOUS BLD VENIPUNCTURE: CPT

## 2019-04-08 PROCEDURE — 99214 OFFICE O/P EST MOD 30 MIN: CPT

## 2019-04-09 LAB
ALBUMIN SERPL ELPH-MCNC: 4.8 G/DL
ALP BLD-CCNC: 112 U/L
ALT SERPL-CCNC: 17 U/L
ANION GAP SERPL CALC-SCNC: 11 MMOL/L
AST SERPL-CCNC: 19 U/L
BASOPHILS # BLD AUTO: 0.03 K/UL
BASOPHILS NFR BLD AUTO: 0.6 %
BILIRUB SERPL-MCNC: 0.2 MG/DL
BUN SERPL-MCNC: 20 MG/DL
CALCIUM SERPL-MCNC: 9.5 MG/DL
CANCER AG125 SERPL-ACNC: 35 U/ML
CHLORIDE SERPL-SCNC: 106 MMOL/L
CO2 SERPL-SCNC: 24 MMOL/L
CREAT SERPL-MCNC: 0.99 MG/DL
EOSINOPHIL # BLD AUTO: 0.08 K/UL
EOSINOPHIL NFR BLD AUTO: 1.6 %
HCT VFR BLD CALC: 43 %
HGB BLD-MCNC: 13.3 G/DL
IMM GRANULOCYTES NFR BLD AUTO: 0.4 %
LYMPHOCYTES # BLD AUTO: 1.15 K/UL
LYMPHOCYTES NFR BLD AUTO: 22.3 %
MAN DIFF?: NORMAL
MCHC RBC-ENTMCNC: 30.4 PG
MCHC RBC-ENTMCNC: 30.9 GM/DL
MCV RBC AUTO: 98.4 FL
MONOCYTES # BLD AUTO: 0.29 K/UL
MONOCYTES NFR BLD AUTO: 5.6 %
NEUTROPHILS # BLD AUTO: 3.59 K/UL
NEUTROPHILS NFR BLD AUTO: 69.5 %
PLATELET # BLD AUTO: 232 K/UL
POTASSIUM SERPL-SCNC: 4.4 MMOL/L
PROT SERPL-MCNC: 7.4 G/DL
RBC # BLD: 4.37 M/UL
RBC # FLD: 12.6 %
SODIUM SERPL-SCNC: 141 MMOL/L
WBC # FLD AUTO: 5.16 K/UL

## 2019-04-10 ENCOUNTER — APPOINTMENT (OUTPATIENT)
Dept: RADIATION ONCOLOGY | Facility: CLINIC | Age: 69
End: 2019-04-10
Payer: COMMERCIAL

## 2019-04-10 VITALS
SYSTOLIC BLOOD PRESSURE: 136 MMHG | HEART RATE: 64 BPM | OXYGEN SATURATION: 96 % | BODY MASS INDEX: 26.83 KG/M2 | DIASTOLIC BLOOD PRESSURE: 78 MMHG | RESPIRATION RATE: 18 BRPM | WEIGHT: 187 LBS

## 2019-04-10 PROCEDURE — 99214 OFFICE O/P EST MOD 30 MIN: CPT

## 2019-04-10 RX ORDER — AMITRIPTYLINE HYDROCHLORIDE 50 MG/1
50 TABLET, FILM COATED ORAL
Refills: 0 | Status: DISCONTINUED | COMMUNITY
End: 2019-04-10

## 2019-04-10 RX ORDER — GABAPENTIN 300 MG/1
300 CAPSULE ORAL
Qty: 30 | Refills: 11 | Status: DISCONTINUED | COMMUNITY
Start: 2018-09-17 | End: 2019-04-10

## 2019-04-10 NOTE — HISTORY OF PRESENT ILLNESS
[FreeTextEntry1] : Ms. Shetty completed 5040 cGy to the Pelvic/ PA nodes, followed by 1200 cGy of vaginal brachytherapy boost  from 3/19/18-5/10/18 for a malignant neoplasm of the uterus. She received chemo with Dr. Manzano and declined final cycle due to extreme fatigue and neuropathy. She tolerated treatment well with intermittent diarrhea and urinary symptoms near the end of treatment.\par \par 4/10/19- FOLLOW UP\par Ms. Shetty returns for routine follow up. When she was last seen on 10/10/18, she was doing well and recent PET showed JEREMIAS. Plan was to continue follow up with Dr. Urbina. She saw ELLIOTT Thakkar on 1/11/19, at which time she reported loose stools and urinary incontinence at times. She saw Dr. Urbina on 4/8/19; advised to continue PT and will follow up 3 months. CA-125 checked as well on 4/8/19 and it was WNL. Patient reports CT scan was ordered; insurance auth is pending. \par \par Today, she reports she continues to "lose" small amount of urine, but it only happens directly after urinating. She is going to see a urologist for this. She also reports neuropathy in bilateral feet and fingertips continues. She tried Gabapentin, but discontinued it as she felt it made her depressed. She also reports she has been using vaginal dilator once a month, and experienced discomfort during pelvic exam done this week. She denies dysuria, hematuria, GI complaints to include diarrhea or blood in the stool, vaginal pain or discharge. She denies fever, chills, CP, SOB, or any other c/o. \par \par \par 10/10/18- FOLLOW UP\par Ms. Shetty returns for routine follow up. At her previous visit on 6/13/18, she was doing well; plan was to continue follow up with Dr. Urbina, who she last saw on 9/17/18. Dr. Urbina advised her to start Neurontin for the neuropathy, but she has not yet done so. Of note, her CA-125 was elevated to 58 on 9/17/18. Patient reports she had  PET/ CT scan yesterday. \par \par Today, she reports she feels well. Denies fever, chills, myalgia, fatigue. She denies hematuria, blood in stool, diarrhea, constipation. She notes some urinary "dribbling" at times a few minutes after she urinates. Denies any other  issues to include dysuria, frequency.  Denies vaginal discharge, dryness,  or bleeding. She has not yet started using vaginal dilator, but notes that Dr. Urbina did a vaginal exam on 9/17/18 and noted some irritation. Continues to report numbness and tingling to BLE; states she may start taking Gabapentin.\par \par \par 6/13/18- PTE\par Ms. Shetty completed 5040 cGy to the Pelvic/ PA nodes, followed by 1200 cGy of vaginal brachytherapy boost  from 3/19/18-5/10/18 for a malignant neoplasm of the uterus. She received chemo with Dr. Manzano and declined final cycle due to extreme fatigue and neuropathy. She tolerated treatment well with intermittent diarrhea and urinary symptoms near the end of treatment.\par \par Today she feels well. Energy levels continue to improve. Still with some pain to bilateral LE muscles on movement. Slight irritation to her external vaginal lip s when she uses different soaps, although no visible irritation. \par She is happy to be working 5 days per week, 5 hours a day. \par Alternating diarrhea and constipation.\par \par Oncologic History\par Ms. Jennifer Shetty presents today for consideration for radiation therapy for endometrial cancer. \par \par She was referred to Dr. Urbina initially in October 2017 for a new diagnosis of clear cell carcinoma. \par \par She began experiencing sylvain colored discharge in February 2017. She believed at the time this was due to fibroids. At that time she additionally was experienceing bilateral lower pelvis discomfort, and lower mid back pain.  She had a Thickened endometrium on ultrasound. We do not have the results of this original ultrasound. \par \par She underwent a D&C on 10/5/17. Pathology from this revealed fragments of clear cell carcinoma in the endometrial cutterage.  was 108 at that time. \par \par CT chest abdomen pelvis on 10/14/17 showed findings of endometrial carcinoma extending into the posterior uterine wall, negative for adenopathy or metastatic disease. \par \par She underwent hysterectomy on 10/26/17. Omental resection was negative for metastatic carcinoma. The uterus, cervix, bilateral tubes, and ovaries, total abdominal hysterectomy, and bilateral salpingo-oophrectomy revealed clear cell carcinoma, poorly differentiated. Carcinoma spanned 16 cm, arising from the posterior endometrium, invading 7 cm of a 7,5 cm thick myometrium with involvement of the cervical stroma. There was extensive LVI. All margins were negativeThe uninvolved myometrium is inactive and the myometrium showed adenomsis. Histologically unremarkable bilateral fallopian tubes and ovaries. \par There was metastatic caricnoma to 1/4 lymph nodes, No extranodal extensionw as identified. \par There was metastatic carcinoma to 1/2 lymph nodes to the right para-aortic area. No extranodal extension is identified. \par There was metastatic carcinoma to the 7/8 lymph nodes to the left para-aortic area. \par Five lymph nodes to the left pelvis were negative for metastatic carcinoma. \par Pelvic washings were negative. \par \par She then started treatment with carbo/taxol with Dr. Urbina. She has compelted 5/6 treatments. She notes she is considering stopping treatment early. She has had very severe fatigue during the last two cycles of chemo. She additionally has numbness/tingling to her bilateral feet, and hands, as well as the tip of her tongue and nose. \par \par  during treatment has trended down from 108 on 10/11/17 to 33 on 1/31/18.\par \par Today she notes fatigue. Her appetite remains good. She has intermittent chills after chemo. As mentioned above, she has numbness and tingling to her bilateral feet, and hands, as well as the tip of her tongue and nose. Since surgery she has had some numbness/disturbed sensation to her bilateral upper thighs. She notes she is anxious. She has intermittent palpitations, as well as intermittent SOB, which has been long standing. She notes her hand and feet are swollen bilaterally, and has some floaters in her vision. She has a bitter taste in her mouth. 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 \par \par \par \par

## 2019-04-10 NOTE — PHYSICAL EXAM
[General Appearance - Well Developed] : well developed [General Appearance - Alert] : alert [General Appearance - In No Acute Distress] : in no acute distress [Sclera] : the sclera and conjunctiva were normal [Extraocular Movements] : extraocular movements were intact [Outer Ear] : the ears and nose were normal in appearance [Hearing Threshold Finger Rub Not [place]] : hearing was normal [Heart Rate And Rhythm] : heart rate and rhythm were normal [Heart Sounds] : normal S1 and S2 [Bowel Sounds] : normal bowel sounds [Abdomen Soft] : soft [Abdomen Tenderness] : non-tender [No Focal Deficits] : no focal deficits [Oriented To Time, Place, And Person] : oriented to person, place, and time [Normal] : no palpable adenopathy [de-identified] : Patient declined exam.  [de-identified] : anxious

## 2019-04-10 NOTE — REVIEW OF SYSTEMS
[Fatigue] : fatigue [Anxiety] : anxiety [Negative] : Heme/Lymph [Constipation: Grade 0] : Constipation: Grade 0 [Diarrhea: Grade 0] : Diarrhea: Grade 0 [Fecal Incontinence: Grade 0] : Fecal Incontinence: Grade 0 [Fatigue: Grade 0] : Fatigue: Grade 0 [Hematuria: Grade 0] : Hematuria: Grade 0 [Urinary Urgency: Grade 0] : Urinary Urgency: Grade 0 [Urinary Frequency: Grade 0] : Urinary Frequency: Grade 0 [Dermatitis Radiation: Grade 0] : Dermatitis Radiation: Grade 0 [Constipation] : no constipation [Night Sweats] : no night sweats [Abdominal Pain] : no abdominal pain [Diarrhea] : no diarrhea [Nocturia] : no nocturia [Vaginal Discharge] : no vaginal discharge [Dysmenorrhea/Abn Vaginal Bleeding] : no dysmenorrhea/abnormal vaginal bleeding [Skin Rash] : no skin rash [FreeTextEntry2] : fatigue improving [de-identified] : neuropathy post chemo/ numbness and tingling to BLE [FreeTextEntry9] : pain to bilateral lower extremities

## 2019-04-16 NOTE — DISCUSSION/SUMMARY
[FreeTextEntry1] : Surveillance visit \par \par -Pt is clinically JEREMIAS\par -Reviewed expectations for recovery and symptom management\par -Continue PT\par -Pt verbalized understanding\par -PT will follow-up in 3 months \par

## 2019-04-16 NOTE — PHYSICAL EXAM
[Absent] : Adnexa(ae): Absent [Abnormal] : Anus, perineum, and rectum: Abnormal [External Hemorrhoid] : were present [Normal] : Recto-Vaginal Exam: Normal [Restricted in physically strenuous activity but ambulatory and able to carry out work of a light or sedentary nature] : Status 1- Restricted in physically strenuous activity but ambulatory and able to carry out work of a light or sedentary nature, e.g., light house work, office work [Tender, Swollen] : nontender, non-swollen [de-identified] : healing midline vertical incision [de-identified] : radiation changes, erythema, pettichiae

## 2019-04-16 NOTE — HISTORY OF PRESENT ILLNESS
[FreeTextEntry1] : Problem\par 1)  Stage IIIC2, Clear Cell Carcinoma of the endometrium\par \par \par Previous Therapy\par 1)D&C hysteroscopy EMB 10/3/17\par    a)Clear cell carcinoma \par 2) EMY, BSO, omentectomy 10/17/17\par    a)Omentum negative \par    b) Uterus, cervix, b/l tubes, ovaries clear cell carcinoma, poorly differentiated\par    c)Right pelvic LN 1/4\par    d) Right para aortic LN 1/2\par    e) Left para aortic LN 7/8\par    f) Left pelvis LN 0/5\par 3) Adjuvant carboplatin and paclitaxel x1, initiated 11/14/2017, cycle # 6 refused by patient\par 4)Pt completed EBRT on 4/26/2018\par 5)CTAP 9/25/18\par    a)Interval development of mild left hydroureter and mild ureteritis at the left UVJ, possibly secondary to radiation change \par 6)PET 10/9/18\par    a)No evidence of disease recurrence \par    b)No significant hydroureteronephrosis appreciated this sturdy. There is however decreased excretion of FDG in the left ureter which may indicate a mild component of obstruction, corresponding to mild left hydroureter and distal ureteritis seen on CT from 9/25/18 \par \par Pt presents today for her fourth surveillance visit.  Feeling well. Still has neuropathy and weakness. Feels abdominal cramping at times. \par \par Mammogram 6/2018 -Birads 2\par Bone Density 7/2018 - WDL\par Colonoscopy 10 years ago - due now\par Bone Density - 3 years ago.

## 2019-07-01 ENCOUNTER — APPOINTMENT (OUTPATIENT)
Dept: GYNECOLOGIC ONCOLOGY | Facility: CLINIC | Age: 69
End: 2019-07-01
Payer: COMMERCIAL

## 2019-07-01 ENCOUNTER — LABORATORY RESULT (OUTPATIENT)
Age: 69
End: 2019-07-01

## 2019-07-01 VITALS
HEART RATE: 87 BPM | SYSTOLIC BLOOD PRESSURE: 137 MMHG | WEIGHT: 193.13 LBS | HEIGHT: 70 IN | BODY MASS INDEX: 27.65 KG/M2 | OXYGEN SATURATION: 98 % | DIASTOLIC BLOOD PRESSURE: 78 MMHG

## 2019-07-01 PROCEDURE — 36415 COLL VENOUS BLD VENIPUNCTURE: CPT

## 2019-07-01 PROCEDURE — 99214 OFFICE O/P EST MOD 30 MIN: CPT

## 2019-07-01 RX ORDER — CLONAZEPAM 1 MG/1
1 TABLET ORAL
Qty: 60 | Refills: 0 | Status: COMPLETED | COMMUNITY
Start: 2018-10-29 | End: 2019-07-01

## 2019-07-01 RX ORDER — DULOXETINE HYDROCHLORIDE 20 MG/1
20 CAPSULE, DELAYED RELEASE PELLETS ORAL TWICE DAILY
Qty: 60 | Refills: 3 | Status: ACTIVE | COMMUNITY
Start: 2019-07-01 | End: 1900-01-01

## 2019-07-01 RX ORDER — CLONAZEPAM 2 MG/1
TABLET ORAL
Refills: 0 | Status: COMPLETED | COMMUNITY
End: 2019-07-01

## 2019-07-02 NOTE — DISCUSSION/SUMMARY
[FreeTextEntry1] : Surveillance visit \par \par -Pt is clinically JEREMIAS\par -Reviewed expectations for recovery and symptom management\par -Continue PT\par -I would consider Cymbalta but she is currently on other medications that may have interaction so will continue expectant management as of now\par -Pt verbalized understanding\par -PT will follow-up in 3 months \par

## 2019-07-02 NOTE — PHYSICAL EXAM
[Absent] : Adnexa(ae): Absent [Abnormal] : Anus, perineum, and rectum: Abnormal [External Hemorrhoid] : were present [Normal] : Recto-Vaginal Exam: Normal [Restricted in physically strenuous activity but ambulatory and able to carry out work of a light or sedentary nature] : Status 1- Restricted in physically strenuous activity but ambulatory and able to carry out work of a light or sedentary nature, e.g., light house work, office work [Tender, Swollen] : nontender, non-swollen [de-identified] : healing midline vertical incision [de-identified] : radiation changes, erythema, pettichiae

## 2019-07-02 NOTE — HISTORY OF PRESENT ILLNESS
[FreeTextEntry1] : Problem\par 1)  Stage IIIC2, Clear Cell Carcinoma of the endometrium\par \par Previous Therapy\par 1)D&C hysteroscopy EMB 10/3/17\par    a)Clear cell carcinoma \par 2) EMY, BSO, omentectomy 10/17/17\par    a)Omentum negative \par    b) Uterus, cervix, b/l tubes, ovaries clear cell carcinoma, poorly differentiated\par    c)Right pelvic LN 1/4\par    d) Right para aortic LN 1/2\par    e) Left para aortic LN 7/8\par    f) Left pelvis LN 0/5\par 3) Adjuvant carboplatin and paclitaxel x1, initiated 11/14/2017, cycle # 6 refused by patient\par 4)Pt completed EBRT on 4/26/2018\par 5)CTAP 9/25/18\par    a)Interval development of mild left hydroureter and mild ureteritis at the left UVJ, possibly secondary to radiation change \par 6)PET 10/9/18\par    a)No evidence of disease recurrence \par    b)No significant hydroureteronephrosis appreciated this sturdy. There is however decreased excretion of FDG in the left ureter which may indicate a mild component of obstruction, corresponding to mild left hydroureter and distal ureteritis seen on CT from 9/25/18 \par \par Pt presents today for surveillance visit.  Has been exercising but her muscles and neuropathy cause her to need to stop.  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  She is suffering with her neuropathy.  \par \par Mammogram 6/2018 -Birads 2\par Bone Density 7/2018 - WDL\par Colonoscopy 10 years ago - due now\par Bone Density - 3 years ago.

## 2019-07-02 NOTE — REVIEW OF SYSTEMS
[Negative] : Respiratory [Neuropathy] : neuropathy [Incontinence] : incontinence [Muscle Weakness] : muscle weakness [FreeTextEntry2] : toes and feel [FreeTextEntry4] : vaginal dryness and sensitive, has leakage of a little urine after she urinates.   [de-identified] : loose stools. hemorrhoids. [de-identified] : OT and PT for a few months and she has a lot of pain with this stretching in the sides of thigh.  It is bothering her to do her job.

## 2019-07-10 LAB
ALBUMIN SERPL ELPH-MCNC: 4.6 G/DL
ALP BLD-CCNC: 117 U/L
ALT SERPL-CCNC: 14 U/L
ANION GAP SERPL CALC-SCNC: 16 MMOL/L
AST SERPL-CCNC: 16 U/L
BASOPHILS # BLD AUTO: 0.04 K/UL
BASOPHILS NFR BLD AUTO: 0.7 %
BILIRUB SERPL-MCNC: 0.3 MG/DL
BUN SERPL-MCNC: 18 MG/DL
CALCIUM SERPL-MCNC: 9.4 MG/DL
CANCER AG125 SERPL-ACNC: 43 U/ML
CHLORIDE SERPL-SCNC: 102 MMOL/L
CO2 SERPL-SCNC: 22 MMOL/L
CREAT SERPL-MCNC: 1.13 MG/DL
EOSINOPHIL # BLD AUTO: 0.15 K/UL
EOSINOPHIL NFR BLD AUTO: 2.8 %
ESTIMATED AVERAGE GLUCOSE: 117 MG/DL
HBA1C MFR BLD HPLC: 5.7 %
HCT VFR BLD CALC: 44.6 %
HGB BLD-MCNC: 13.6 G/DL
IMM GRANULOCYTES NFR BLD AUTO: 0.4 %
LYMPHOCYTES # BLD AUTO: 1.47 K/UL
LYMPHOCYTES NFR BLD AUTO: 27.5 %
MAGNESIUM SERPL-MCNC: 2.1 MG/DL
MAN DIFF?: NORMAL
MCHC RBC-ENTMCNC: 30.5 GM/DL
MCHC RBC-ENTMCNC: 30.7 PG
MCV RBC AUTO: 100.7 FL
MONOCYTES # BLD AUTO: 0.38 K/UL
MONOCYTES NFR BLD AUTO: 7.1 %
NEUTROPHILS # BLD AUTO: 3.28 K/UL
NEUTROPHILS NFR BLD AUTO: 61.5 %
PLATELET # BLD AUTO: 250 K/UL
POTASSIUM SERPL-SCNC: 4.4 MMOL/L
PROT SERPL-MCNC: 7.1 G/DL
RBC # BLD: 4.43 M/UL
RBC # FLD: 13.1 %
SODIUM SERPL-SCNC: 140 MMOL/L
WBC # FLD AUTO: 5.34 K/UL

## 2019-08-05 ENCOUNTER — APPOINTMENT (OUTPATIENT)
Dept: GYNECOLOGIC ONCOLOGY | Facility: CLINIC | Age: 69
End: 2019-08-05

## 2019-10-07 ENCOUNTER — APPOINTMENT (OUTPATIENT)
Dept: GYNECOLOGIC ONCOLOGY | Facility: CLINIC | Age: 69
End: 2019-10-07
Payer: COMMERCIAL

## 2019-10-07 VITALS
SYSTOLIC BLOOD PRESSURE: 138 MMHG | WEIGHT: 190 LBS | BODY MASS INDEX: 27.2 KG/M2 | HEIGHT: 70 IN | DIASTOLIC BLOOD PRESSURE: 76 MMHG

## 2019-10-07 PROCEDURE — 36415 COLL VENOUS BLD VENIPUNCTURE: CPT

## 2019-10-07 PROCEDURE — 99214 OFFICE O/P EST MOD 30 MIN: CPT

## 2019-10-07 NOTE — REVIEW OF SYSTEMS
[Negative] : Respiratory [Neuropathy] : neuropathy [Incontinence] : incontinence [Muscle Weakness] : muscle weakness [FreeTextEntry2] : toes and feel [FreeTextEntry4] : vaginal dryness and sensitive, has leakage of a little urine after she urinates.   [de-identified] : loose stools. hemorrhoids. [de-identified] : still with muscle weakness.  trying to exercise more. It is bothering her to do her job.

## 2019-10-07 NOTE — PHYSICAL EXAM
[Absent] : Adnexa(ae): Absent [External Hemorrhoid] : were present [Abnormal] : Anus, perineum, and rectum: Abnormal [Normal] : Recto-Vaginal Exam: Normal [Restricted in physically strenuous activity but ambulatory and able to carry out work of a light or sedentary nature] : Status 1- Restricted in physically strenuous activity but ambulatory and able to carry out work of a light or sedentary nature, e.g., light house work, office work [Tender, Swollen] : nontender, non-swollen [de-identified] : hernia noted to R of upper incision, reducible [de-identified] : radiation changes, erythema, pettichiae

## 2019-10-07 NOTE — DISCUSSION/SUMMARY
[FreeTextEntry1] : Surveillance visit \par \par -Abdominal pain and new hernia - will order CTAP to r/o recurrence\par -Reviewed expectations for recovery and symptom management\par -Continue PT for neuropathy\par -Pt verbalized understanding\par -PT will follow-up in 4 months if CT negative\par

## 2019-10-07 NOTE — HISTORY OF PRESENT ILLNESS
[FreeTextEntry1] : Problem\par 1)  Stage IIIC2, Clear Cell Carcinoma of the endometrium\par \par Previous Therapy\par 1)D&C hysteroscopy EMB 10/3/17\par    a)Clear cell carcinoma \par 2) EMY, BSO, omentectomy 10/17/17\par    a)Omentum negative \par    b) Uterus, cervix, b/l tubes, ovaries clear cell carcinoma, poorly differentiated\par    c)Right pelvic LN 1/4\par    d) Right para aortic LN 1/2\par    e) Left para aortic LN 7/8\par    f) Left pelvis LN 0/5\par 3) Adjuvant carboplatin and paclitaxel x1, initiated 11/14/2017, cycle # 6 refused by patient\par 4)Pt completed EBRT on 4/26/2018\par 5)CTAP 9/25/18\par    a)Interval development of mild left hydroureter and mild ureteritis at the left UVJ, possibly secondary to radiation change \par 6)PET 10/9/18\par    a)No evidence of disease recurrence \par    b)No significant hydroureteronephrosis appreciated this sturdy. There is however decreased excretion of FDG in the left ureter which may indicate a mild component of obstruction, corresponding to mild left hydroureter and distal ureteritis seen on CT from 9/25/18 \par \par Pt presents today for surveillance visit.  Reports abdominal pain and thinks she has a hernia.  Still with considerable muscle weakness.\par \par Mammogram 6/2018 -Birads 2\par Bone Density 7/2018 - WDL\par Colonoscopy 10 years ago - due now\par Bone Density - 3 years ago.

## 2019-10-08 LAB
ALBUMIN SERPL ELPH-MCNC: 4.9 G/DL
ALP BLD-CCNC: 117 U/L
ALT SERPL-CCNC: 12 U/L
ANION GAP SERPL CALC-SCNC: 15 MMOL/L
AST SERPL-CCNC: 20 U/L
BASOPHILS # BLD AUTO: 0.03 K/UL
BASOPHILS NFR BLD AUTO: 0.6 %
BILIRUB SERPL-MCNC: 0.2 MG/DL
BUN SERPL-MCNC: 22 MG/DL
CALCIUM SERPL-MCNC: 9.4 MG/DL
CANCER AG125 SERPL-ACNC: 36 U/ML
CHLORIDE SERPL-SCNC: 105 MMOL/L
CO2 SERPL-SCNC: 23 MMOL/L
CREAT SERPL-MCNC: 1.14 MG/DL
EOSINOPHIL # BLD AUTO: 0.08 K/UL
EOSINOPHIL NFR BLD AUTO: 1.7 %
HCT VFR BLD CALC: 44.5 %
HGB BLD-MCNC: 13.9 G/DL
IMM GRANULOCYTES NFR BLD AUTO: 0.2 %
LYMPHOCYTES # BLD AUTO: 0.99 K/UL
LYMPHOCYTES NFR BLD AUTO: 20.7 %
MAN DIFF?: NORMAL
MCHC RBC-ENTMCNC: 30.6 PG
MCHC RBC-ENTMCNC: 31.2 GM/DL
MCV RBC AUTO: 98 FL
MONOCYTES # BLD AUTO: 0.34 K/UL
MONOCYTES NFR BLD AUTO: 7.1 %
NEUTROPHILS # BLD AUTO: 3.33 K/UL
NEUTROPHILS NFR BLD AUTO: 69.7 %
PLATELET # BLD AUTO: 231 K/UL
POTASSIUM SERPL-SCNC: 4.6 MMOL/L
PROT SERPL-MCNC: 7.1 G/DL
RBC # BLD: 4.54 M/UL
RBC # FLD: 12.6 %
SODIUM SERPL-SCNC: 143 MMOL/L
WBC # FLD AUTO: 4.78 K/UL

## 2019-10-16 ENCOUNTER — APPOINTMENT (OUTPATIENT)
Dept: RADIATION ONCOLOGY | Facility: CLINIC | Age: 69
End: 2019-10-16
Payer: COMMERCIAL

## 2019-10-16 VITALS
DIASTOLIC BLOOD PRESSURE: 76 MMHG | WEIGHT: 191 LBS | RESPIRATION RATE: 18 BRPM | HEART RATE: 63 BPM | OXYGEN SATURATION: 98 % | BODY MASS INDEX: 27.41 KG/M2 | SYSTOLIC BLOOD PRESSURE: 132 MMHG

## 2019-10-16 PROCEDURE — 99214 OFFICE O/P EST MOD 30 MIN: CPT

## 2019-10-16 RX ORDER — AMITRIPTYLINE HYDROCHLORIDE 10 MG/1
10 TABLET, FILM COATED ORAL
Refills: 0 | Status: DISCONTINUED | COMMUNITY
End: 2019-10-16

## 2019-10-16 RX ORDER — ALPRAZOLAM 2 MG/1
TABLET ORAL
Refills: 0 | Status: ACTIVE | COMMUNITY

## 2019-10-16 RX ORDER — OXCARBAZEPINE 150 MG/1
TABLET, FILM COATED ORAL
Refills: 0 | Status: ACTIVE | COMMUNITY

## 2019-10-16 RX ORDER — OMEPRAZOLE 40 MG/1
CAPSULE, DELAYED RELEASE ORAL
Refills: 0 | Status: DISCONTINUED | COMMUNITY
End: 2019-10-16

## 2019-10-18 NOTE — PHYSICAL EXAM
[General Appearance - In No Acute Distress] : in no acute distress [General Appearance - Well Developed] : well developed [General Appearance - Alert] : alert [Extraocular Movements] : extraocular movements were intact [Sclera] : the sclera and conjunctiva were normal [Outer Ear] : the ears and nose were normal in appearance [Hearing Threshold Finger Rub Not [place]] : hearing was normal [Heart Sounds] : normal S1 and S2 [Heart Rate And Rhythm] : heart rate and rhythm were normal [Abdomen Soft] : soft [Bowel Sounds] : normal bowel sounds [Abdomen Tenderness] : non-tender [Normal] : normal skin color and pigmentation and no rash [No Focal Deficits] : no focal deficits [Oriented To Time, Place, And Person] : oriented to person, place, and time [Nondistended] : nondistended [de-identified] : Reducible hernia on the right, nontender.  [de-identified] : Patient declined exam.

## 2019-10-18 NOTE — REVIEW OF SYSTEMS
[Fatigue] : fatigue [Anxiety] : anxiety [Negative] : Endocrine [Constipation: Grade 0] : Constipation: Grade 0 [Diarrhea: Grade 0] : Diarrhea: Grade 0 [Fecal Incontinence: Grade 0] : Fecal Incontinence: Grade 0 [Hematuria: Grade 0] : Hematuria: Grade 0 [Fatigue: Grade 0] : Fatigue: Grade 0 [Urinary Urgency: Grade 0] : Urinary Urgency: Grade 0 [Urinary Frequency: Grade 0] : Urinary Frequency: Grade 0 [Dermatitis Radiation: Grade 0] : Dermatitis Radiation: Grade 0 [Abdominal Pain] : no abdominal pain [Night Sweats] : no night sweats [Constipation] : no constipation [Diarrhea] : no diarrhea [Nocturia] : no nocturia [Vaginal Discharge] : no vaginal discharge [Dysmenorrhea/Abn Vaginal Bleeding] : no dysmenorrhea/abnormal vaginal bleeding [Skin Rash] : no skin rash [FreeTextEntry2] : fatigue improving [FreeTextEntry9] : pain to bilateral lower extremities [de-identified] : neuropathy post chemo/ numbness and tingling to BLE

## 2019-10-18 NOTE — DISEASE MANAGEMENT
[Pathological] : TNM Stage: p [IIIC] : IIIC [TTNM] : 1b [FreeTextEntry4] : clear cell endometrial [NTNM] : 2a [MTNM] : 0

## 2019-10-18 NOTE — HISTORY OF PRESENT ILLNESS
[FreeTextEntry1] : Ms. Shetty completed 5040 cGy to the Pelvic/ PA nodes, followed by 1200 cGy of brachytherapy to the vaginal cuff from 3/19/18- 5/10/18 for a [id number] (stage IIIC2) clear cell carcinoma . She received chemo with Dr. Manzano and declined final cycle due to extreme fatigue and neuropathy. She tolerated treatment well with intermittent diarrhea and urinary symptoms near the end of treatment.\par \par 10/16/19- FOLLOW UP \par Ms. Shetty returns for routine follow up. When she was last seen on 4/10/19, she was doing well and recent PET showed JEREMIAS. Plan was to continue follow up with Dr. Urbina, who she last saw on 10/7/19. Noted to have a new hernia to the right of upper incision.\par \par Today, she reports hernia is not painful, no abdominal pain. Will continue to monitor it and not proceed with surgical intervention. She continues to report neuropathy in bilateral feet and fingertips. She reports urinary leakage has improved significantly, but sometimes experiences urinary hesitancy. She plans to follow up with a urologist for this. She otherwise denies  complaints to include dysuria, hematuria. She reports some foods, such as dairy, cause softer stools, but otherwise denies GI complaints to include diarrhea, constipation, bloody stool. Denies vaginal pain, bleeding, or discharge. She is using the vaginal dilator twice a week. Denies any further complaints to include CP, SOB, fever, chills. \par \par \par 4/10/19- FOLLOW UP\par Ms. Shetty returns for routine follow up. When she was last seen on 10/10/18, she was doing well, but did note small amount of urinary leakage directly after urinating. Plan was to continue follow up with Dr. Urbina, consider urodynamics consult. She saw Dr. Urbina on 1/11/19, at which time she reported loose stools and urinary incontinence at times. She saw Dr. Urbina on 4/8/19; advised to continue PT and will follow up 3 months. CA-125 checked as well on 4/8/19 and it was WNL. Patient reports CT scan was ordered; insurance auth is pending. \par \par Today, she reports she continues to "lose" small amount of urine, but it only happens directly after urinating. She is going to see a urologist for this. She also reports neuropathy in bilateral feet and fingertips continues. She tried Gabapentin, but discontinued it as she felt it made her depressed. She also reports she has been using vaginal dilator once a month, and experienced discomfort during pelvic exam done this week. She denies dysuria, hematuria, GI complaints to include diarrhea or blood in the stool, vaginal pain or discharge. She denies fever, chills, CP, SOB, or any other c/o. \par \par \par 10/10/18- FOLLOW UP\par Ms. Shetty returns for routine follow up. At her previous visit on 6/13/18, she was doing well; plan was to continue follow up with Dr. Urbina, who she last saw on 9/17/18. Dr. Urbina advised her to start Neurontin for the neuropathy, but she has not yet done so. Of note, her CA-125 was elevated to 58 on 9/17/18. Patient reports she had  PET/ CT scan yesterday. \par \par Today, she reports she feels well. Denies fever, chills, myalgia, fatigue. She denies hematuria, blood in stool, diarrhea, constipation. She notes some urinary "dribbling" at times a few minutes after she urinates. Denies any other  issues to include dysuria, frequency.  Denies vaginal discharge, dryness,  or bleeding. She has not yet started using vaginal dilator, but notes that Dr. Urbina did a vaginal exam on 9/17/18 and noted some irritation. Continues to report numbness and tingling to BLE; states she may start taking Gabapentin.\par \par \par 6/13/18- PTE\par Ms. Shetty completed 5040 cGy to the Pelvic/ PA nodes, followed by 1200 cGy of vaginal brachytherapy boost  from 3/19/18-5/10/18 for a malignant neoplasm of the uterus. She received chemo with Dr. Manzano and declined final cycle due to extreme fatigue and neuropathy. She tolerated treatment well with intermittent diarrhea and urinary symptoms near the end of treatment.\par \par Today she feels well. Energy levels continue to improve. Still with some pain to bilateral LE muscles on movement. Slight irritation to her external vaginal lip s when she uses different soaps, although no visible irritation. \par She is happy to be working 5 days per week, 5 hours a day. \par Alternating diarrhea and constipation.\par \par Oncologic History\par Ms. Jennifer Shetty presents today for consideration for radiation therapy for endometrial cancer. \par \par She was referred to Dr. Urbina initially in October 2017 for a new diagnosis of clear cell carcinoma. \par \par She began experiencing sylvain colored discharge in February 2017. She believed at the time this was due to fibroids. At that time she additionally was experienceing bilateral lower pelvis discomfort, and lower mid back pain.  She had a Thickened endometrium on ultrasound. We do not have the results of this original ultrasound. \par \par She underwent a D&C on 10/5/17. Pathology from this revealed fragments of clear cell carcinoma in the endometrial cutterage.  was 108 at that time. \par \par CT chest abdomen pelvis on 10/14/17 showed findings of endometrial carcinoma extending into the posterior uterine wall, negative for adenopathy or metastatic disease. \par \par She underwent hysterectomy on 10/26/17. Omental resection was negative for metastatic carcinoma. The uterus, cervix, bilateral tubes, and ovaries, total abdominal hysterectomy, and bilateral salpingo-oophrectomy revealed clear cell carcinoma, poorly differentiated. Carcinoma spanned 16 cm, arising from the posterior endometrium, invading 7 cm of a 7,5 cm thick myometrium with involvement of the cervical stroma. There was extensive LVI. All margins were negativeThe uninvolved myometrium is inactive and the myometrium showed adenomsis. Histologically unremarkable bilateral fallopian tubes and ovaries. \par There was metastatic caricnoma to 1/4 lymph nodes, No extranodal extensionw as identified. \par There was metastatic carcinoma to 1/2 lymph nodes to the right para-aortic area. No extranodal extension is identified. \par There was metastatic carcinoma to the 7/8 lymph nodes to the left para-aortic area. \par Five lymph nodes to the left pelvis were negative for metastatic carcinoma. \par Pelvic washings were negative. \par \par She then started treatment with carbo/taxol with Dr. Urbina. She has compelted 5/6 treatments. She notes she is considering stopping treatment early. She has had very severe fatigue during the last two cycles of chemo. She additionally has numbness/tingling to her bilateral feet, and hands, as well as the tip of her tongue and nose. \par \par  during treatment has trended down from 108 on 10/11/17 to 33 on 1/31/18.\par \par Today she notes fatigue. Her appetite remains good. She has intermittent chills after chemo. As mentioned above, she has numbness and tingling to her bilateral feet, and hands, as well as the tip of her tongue and nose. Since surgery she has had some numbness/disturbed sensation to her bilateral upper thighs. She notes she is anxious. She has intermittent palpitations, as well as intermittent SOB, which has been long standing. She notes her hand and feet are swollen bilaterally, and has some floaters in her vision. She has a bitter taste in her mouth. 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 \par \par \par \par

## 2020-02-13 NOTE — DISEASE MANAGEMENT
[Pathological] : TNM Stage: p [IIIC] : IIIC [NTNM] : 2a [TTNM] : 1b [FreeTextEntry4] : clear cell endometrial [MTNM] : 0

## 2020-02-13 NOTE — PHYSICAL EXAM
[General Appearance - Well Developed] : well developed [General Appearance - In No Acute Distress] : in no acute distress [Sclera] : the sclera and conjunctiva were normal [General Appearance - Alert] : alert [Extraocular Movements] : extraocular movements were intact [Outer Ear] : the ears and nose were normal in appearance [Hearing Threshold Finger Rub Not [place]] : hearing was normal [Heart Rate And Rhythm] : heart rate and rhythm were normal [Heart Sounds] : normal S1 and S2 [Bowel Sounds] : normal bowel sounds [Abdomen Soft] : soft [Nondistended] : nondistended [Abdomen Tenderness] : non-tender [No Focal Deficits] : no focal deficits [Normal] : normal skin color and pigmentation and no rash [Oriented To Time, Place, And Person] : oriented to person, place, and time [de-identified] : Reducible hernia on the right, nontender.  [de-identified] : Patient declined exam.

## 2020-02-13 NOTE — VITALS
[Least Pain Intensity: 0/10] : 0/10 [Maximal Pain Intensity: 0/10] : 0/10 [90: Able to carry normal activity; minor signs or symptoms of disease.] : 90: Able to carry normal activity; minor signs or symptoms of disease.  [ECOG Performance Status: 1 - Restricted in physically strenuous activity but ambulatory and able to carry out work of a light or sedentary nature] : Performance Status: 1 - Restricted in physically strenuous activity but ambulatory and able to carry out work of a light or sedentary nature, e.g., light house work, office work

## 2020-02-13 NOTE — HISTORY OF PRESENT ILLNESS
[FreeTextEntry1] : Ms. Shetty completed 5040 cGy to the Pelvis/ PA nodes, followed by 1200 cGy of brachytherapy to the vaginal cuff from 3/19/18- 5/10/18 for a [id number] (stage IIIC2) clear cell carcinoma . She received chemo with Dr. Manzano and declined final cycle due to extreme fatigue and neuropathy. She tolerated treatment well with intermittent diarrhea and urinary symptoms near the end of treatment.\par \par 2/19/2020- FOLLOW UP\par Ms. Shetty returns for routine follow up. When she was last seen on 10/16/19, plan was to continue follow up with Dr. Urbina and urologist. She is scheduled to see Dr. Urbina on 2/24/2020. Today, she XXX\par \par ________________________\par 10/16/19- FOLLOW UP \par Ms. Shetty returns for routine follow up. When she was last seen on 4/10/19, she was doing well and recent PET showed JEREMIAS. Plan was to continue follow up with Dr. Urbina, who she last saw on 10/7/19. Noted to have a new hernia to the right of upper incision.\par \par Today, she reports hernia is not painful, no abdominal pain. Will continue to monitor it and not proceed with surgical intervention. She continues to report neuropathy in bilateral feet and fingertips. She reports urinary leakage has improved significantly, but sometimes experiences urinary hesitancy. She plans to follow up with a urologist for this. She otherwise denies  complaints to include dysuria, hematuria. She reports some foods, such as dairy, cause softer stools, but otherwise denies GI complaints to include diarrhea, constipation, bloody stool. Denies vaginal pain, bleeding, or discharge. She is using the vaginal dilator twice a week. Denies any further complaints to include CP, SOB, fever, chills. \par \par \par 4/10/19- FOLLOW UP\par Ms. Shetty returns for routine follow up. When she was last seen on 10/10/18, she was doing well, but did note small amount of urinary leakage directly after urinating. Plan was to continue follow up with Dr. Urbina, consider urodynamics consult. She saw Dr. Urbina on 1/11/19, at which time she reported loose stools and urinary incontinence at times. She saw Dr. Urbina on 4/8/19; advised to continue PT and will follow up 3 months. CA-125 checked as well on 4/8/19 and it was WNL. Patient reports CT scan was ordered; insurance auth is pending. \par \par Today, she reports she continues to "lose" small amount of urine, but it only happens directly after urinating. She is going to see a urologist for this. She also reports neuropathy in bilateral feet and fingertips continues. She tried Gabapentin, but discontinued it as she felt it made her depressed. She also reports she has been using vaginal dilator once a month, and experienced discomfort during pelvic exam done this week. She denies dysuria, hematuria, GI complaints to include diarrhea or blood in the stool, vaginal pain or discharge. She denies fever, chills, CP, SOB, or any other c/o. \par \par \par 10/10/18- FOLLOW UP\par Ms. Shetty returns for routine follow up. At her previous visit on 6/13/18, she was doing well; plan was to continue follow up with Dr. Urbina, who she last saw on 9/17/18. Dr. Urbina advised her to start Neurontin for the neuropathy, but she has not yet done so. Of note, her CA-125 was elevated to 58 on 9/17/18. Patient reports she had  PET/ CT scan yesterday. \par \par Today, she reports she feels well. Denies fever, chills, myalgia, fatigue. She denies hematuria, blood in stool, diarrhea, constipation. She notes some urinary "dribbling" at times a few minutes after she urinates. Denies any other  issues to include dysuria, frequency.  Denies vaginal discharge, dryness,  or bleeding. She has not yet started using vaginal dilator, but notes that Dr. Urbina did a vaginal exam on 9/17/18 and noted some irritation. Continues to report numbness and tingling to BLE; states she may start taking Gabapentin.\par \par \par 6/13/18- PTE\par Ms. Shetty completed 5040 cGy to the Pelvic/ PA nodes, followed by 1200 cGy of vaginal brachytherapy boost  from 3/19/18-5/10/18 for a malignant neoplasm of the uterus. She received chemo with Dr. Manzano and declined final cycle due to extreme fatigue and neuropathy. She tolerated treatment well with intermittent diarrhea and urinary symptoms near the end of treatment.\par \par Today she feels well. Energy levels continue to improve. Still with some pain to bilateral LE muscles on movement. Slight irritation to her external vaginal lip s when she uses different soaps, although no visible irritation. \par She is happy to be working 5 days per week, 5 hours a day. \par Alternating diarrhea and constipation.\par \par Oncologic History\par Ms. Jennifer Shetty presents today for consideration for radiation therapy for endometrial cancer. \par \par She was referred to Dr. Urbina initially in October 2017 for a new diagnosis of clear cell carcinoma. \par \par She began experiencing sylvain colored discharge in February 2017. She believed at the time this was due to fibroids. At that time she additionally was experienceing bilateral lower pelvis discomfort, and lower mid back pain.  She had a Thickened endometrium on ultrasound. We do not have the results of this original ultrasound. \par \par She underwent a D&C on 10/5/17. Pathology from this revealed fragments of clear cell carcinoma in the endometrial cutterage.  was 108 at that time. \par \par CT chest abdomen pelvis on 10/14/17 showed findings of endometrial carcinoma extending into the posterior uterine wall, negative for adenopathy or metastatic disease. \par \par She underwent hysterectomy on 10/26/17. Omental resection was negative for metastatic carcinoma. The uterus, cervix, bilateral tubes, and ovaries, total abdominal hysterectomy, and bilateral salpingo-oophrectomy revealed clear cell carcinoma, poorly differentiated. Carcinoma spanned 16 cm, arising from the posterior endometrium, invading 7 cm of a 7,5 cm thick myometrium with involvement of the cervical stroma. There was extensive LVI. All margins were negativeThe uninvolved myometrium is inactive and the myometrium showed adenomsis. Histologically unremarkable bilateral fallopian tubes and ovaries. \par There was metastatic caricnoma to 1/4 lymph nodes, No extranodal extensionw as identified. \par There was metastatic carcinoma to 1/2 lymph nodes to the right para-aortic area. No extranodal extension is identified. \par There was metastatic carcinoma to the 7/8 lymph nodes to the left para-aortic area. \par Five lymph nodes to the left pelvis were negative for metastatic carcinoma. \par Pelvic washings were negative. \par \par She then started treatment with carbo/taxol with Dr. Urbina. She has compelted 5/6 treatments. She notes she is considering stopping treatment early. She has had very severe fatigue during the last two cycles of chemo. She additionally has numbness/tingling to her bilateral feet, and hands, as well as the tip of her tongue and nose. \par \par  during treatment has trended down from 108 on 10/11/17 to 33 on 1/31/18.\par \par Today she notes fatigue. Her appetite remains good. She has intermittent chills after chemo. As mentioned above, she has numbness and tingling to her bilateral feet, and hands, as well as the tip of her tongue and nose. Since surgery she has had some numbness/disturbed sensation to her bilateral upper thighs. She notes she is anxious. She has intermittent palpitations, as well as intermittent SOB, which has been long standing. She notes her hand and feet are swollen bilaterally, and has some floaters in her vision. She has a bitter taste in her mouth. 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 \par \par \par \par

## 2020-02-13 NOTE — REVIEW OF SYSTEMS
[Fatigue] : fatigue [Anxiety] : anxiety [Negative] : Heme/Lymph [Constipation: Grade 0] : Constipation: Grade 0 [Diarrhea: Grade 0] : Diarrhea: Grade 0 [Fecal Incontinence: Grade 0] : Fecal Incontinence: Grade 0 [Fatigue: Grade 0] : Fatigue: Grade 0 [Hematuria: Grade 0] : Hematuria: Grade 0 [Urinary Urgency: Grade 0] : Urinary Urgency: Grade 0 [Urinary Frequency: Grade 0] : Urinary Frequency: Grade 0 [Dermatitis Radiation: Grade 0] : Dermatitis Radiation: Grade 0 [Abdominal Pain] : no abdominal pain [Night Sweats] : no night sweats [Diarrhea] : no diarrhea [Constipation] : no constipation [Nocturia] : no nocturia [Vaginal Discharge] : no vaginal discharge [Dysmenorrhea/Abn Vaginal Bleeding] : no dysmenorrhea/abnormal vaginal bleeding [Skin Rash] : no skin rash [FreeTextEntry2] : fatigue improving [FreeTextEntry9] : pain to bilateral lower extremities [de-identified] : neuropathy post chemo/ numbness and tingling to BLE

## 2020-02-19 ENCOUNTER — APPOINTMENT (OUTPATIENT)
Dept: RADIATION ONCOLOGY | Facility: CLINIC | Age: 70
End: 2020-02-19

## 2020-02-24 ENCOUNTER — APPOINTMENT (OUTPATIENT)
Dept: GYNECOLOGIC ONCOLOGY | Facility: CLINIC | Age: 70
End: 2020-02-24
Payer: COMMERCIAL

## 2020-02-24 VITALS
OXYGEN SATURATION: 95 % | BODY MASS INDEX: 27.06 KG/M2 | SYSTOLIC BLOOD PRESSURE: 128 MMHG | WEIGHT: 189 LBS | HEART RATE: 77 BPM | DIASTOLIC BLOOD PRESSURE: 75 MMHG | HEIGHT: 70 IN

## 2020-02-24 PROCEDURE — 36415 COLL VENOUS BLD VENIPUNCTURE: CPT

## 2020-02-24 PROCEDURE — 99214 OFFICE O/P EST MOD 30 MIN: CPT

## 2020-02-24 RX ORDER — HYDROCORTISONE ACETATE AND PRAMOXINE HYDROCHLORIDE 10; 10 MG/ML; MG/ML
1-1 LOTION TOPICAL 3 TIMES DAILY
Qty: 1 | Refills: 3 | Status: COMPLETED | COMMUNITY
Start: 2019-01-11 | End: 2020-02-24

## 2020-02-25 LAB
ALBUMIN SERPL ELPH-MCNC: 4.8 G/DL
ALP BLD-CCNC: 124 U/L
ALT SERPL-CCNC: 15 U/L
ANION GAP SERPL CALC-SCNC: 12 MMOL/L
AST SERPL-CCNC: 18 U/L
BASOPHILS # BLD AUTO: 0.04 K/UL
BASOPHILS NFR BLD AUTO: 0.8 %
BILIRUB SERPL-MCNC: 0.2 MG/DL
BUN SERPL-MCNC: 24 MG/DL
CALCIUM SERPL-MCNC: 9.5 MG/DL
CANCER AG125 SERPL-ACNC: 39 U/ML
CHLORIDE SERPL-SCNC: 102 MMOL/L
CO2 SERPL-SCNC: 27 MMOL/L
CREAT SERPL-MCNC: 1.05 MG/DL
EOSINOPHIL # BLD AUTO: 0.11 K/UL
EOSINOPHIL NFR BLD AUTO: 2.1 %
GLUCOSE SERPL-MCNC: 105 MG/DL
HCT VFR BLD CALC: 43.5 %
HGB BLD-MCNC: 13.5 G/DL
IMM GRANULOCYTES NFR BLD AUTO: 0.4 %
LYMPHOCYTES # BLD AUTO: 1.4 K/UL
LYMPHOCYTES NFR BLD AUTO: 26.7 %
MAN DIFF?: NORMAL
MCHC RBC-ENTMCNC: 30.2 PG
MCHC RBC-ENTMCNC: 31 GM/DL
MCV RBC AUTO: 97.3 FL
MONOCYTES # BLD AUTO: 0.34 K/UL
MONOCYTES NFR BLD AUTO: 6.5 %
NEUTROPHILS # BLD AUTO: 3.33 K/UL
NEUTROPHILS NFR BLD AUTO: 63.5 %
PLATELET # BLD AUTO: 259 K/UL
POTASSIUM SERPL-SCNC: 4.8 MMOL/L
PROT SERPL-MCNC: 7 G/DL
RBC # BLD: 4.47 M/UL
RBC # FLD: 12.5 %
SODIUM SERPL-SCNC: 141 MMOL/L
WBC # FLD AUTO: 5.24 K/UL

## 2020-02-27 NOTE — HISTORY OF PRESENT ILLNESS
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\par \par Previous Therapy\par 1)D&C hysteroscopy EMB 10/3/17\par    a)Clear cell carcinoma \par 2) EMY, BSO, omentectomy 10/17/17\par    a)Omentum negative \par    b) Uterus, cervix, b/l tubes, ovaries clear cell carcinoma, poorly differentiated\par    c)Right pelvic LN 1/4\par    d) Right para aortic LN 1/2\par    e) Left para aortic LN 7/8\par    f) Left pelvis LN 0/5\par 3) Adjuvant carboplatin and paclitaxel x1, initiated 11/14/2017, cycle # 6 refused by patient\par 4)Pt completed EBRT on 4/26/2018\par 5)CTAP 9/25/18\par    a)Interval development of mild left hydroureter and mild ureteritis at the left UVJ, possibly secondary to radiation change \par 6)PET 10/9/18\par    a)No evidence of disease recurrence \par    b)No significant hydroureteronephrosis appreciated this sturdy. There is however decreased excretion of FDG in the left ureter which may indicate a mild component of obstruction, corresponding to mild left hydroureter and distal ureteritis seen on CT from 9/25/18 \par \par Pt presents today for surveillance visit. Ca-125= 36 10/2019 (Down from 43 7/2019). Reports abdominal pain and pelvic cramping, new hernia. \par \par \par \par Mammogram 6/2018 -Birads 2\par Bone Density 7/2018 - WDL\par Colonoscopy 10 years ago - due now\par

## 2020-02-27 NOTE — DISCUSSION/SUMMARY
[FreeTextEntry1] : Surveillance visit with symptoms concering for recurrence\par - elevated Ca-125, now with abdominal/pelvic pain and new hernia\par -CTAP to r/o recurrence\par \par -Reviewed expectations for recovery and symptom management\par -Continue PT for neuropathy\par -Pt verbalized understanding\par \par

## 2020-02-27 NOTE — PHYSICAL EXAM
[Absent] : Adnexa(ae): Absent [Abnormal] : Anus, perineum, and rectum: Abnormal [External Hemorrhoid] : were present [Normal] : Recto-Vaginal Exam: Normal [Restricted in physically strenuous activity but ambulatory and able to carry out work of a light or sedentary nature] : Status 1- Restricted in physically strenuous activity but ambulatory and able to carry out work of a light or sedentary nature, e.g., light house work, office work [Tender, Swollen] : nontender, non-swollen [de-identified] : hernia noted to R of upper incision, reducible [de-identified] : radiation changes, erythema, pettichiae

## 2020-02-27 NOTE — REVIEW OF SYSTEMS
[Negative] : Respiratory [Neuropathy] : neuropathy [Incontinence] : incontinence [Muscle Weakness] : muscle weakness [FreeTextEntry4] : vaginal dryness and sensitive, has leakage of a little urine after she urinates.   [FreeTextEntry2] : toes and feel [de-identified] : loose stools. hemorrhoids. [de-identified] : still with muscle weakness.  trying to exercise more. It is bothering her to do her job.

## 2020-04-22 ENCOUNTER — APPOINTMENT (OUTPATIENT)
Dept: RADIATION ONCOLOGY | Facility: CLINIC | Age: 70
End: 2020-04-22

## 2020-05-20 NOTE — BRIEF OPERATIVE NOTE - CO SURGEON
PT CALLED AND WANTED TO KNOW IF SHE CAN TAKE NAPROXEN FOR HER BACK AND ALSO WOULD LIKE A REFFERAL TO PHYSICAL THERAPY FOR HER BACK; SHE SAID SHE USES [facility];    SHE ALSO WAS ASKING IF HEAT OR COLD WOULD HELP HER BACK? IF SHE DOESN'T ANSWER, ITS OK TO LEAVE [facility] OR CALL BACK AFTER 359A    CLAUDIA: [phone]   Carol

## 2020-05-30 ENCOUNTER — APPOINTMENT (OUTPATIENT)
Dept: CT IMAGING | Facility: CLINIC | Age: 70
End: 2020-05-30
Payer: COMMERCIAL

## 2020-06-13 ENCOUNTER — APPOINTMENT (OUTPATIENT)
Dept: CT IMAGING | Facility: CLINIC | Age: 70
End: 2020-06-13

## 2020-07-06 ENCOUNTER — APPOINTMENT (OUTPATIENT)
Dept: GYNECOLOGIC ONCOLOGY | Facility: CLINIC | Age: 70
End: 2020-07-06

## 2020-07-18 ENCOUNTER — APPOINTMENT (OUTPATIENT)
Dept: CT IMAGING | Facility: HOSPITAL | Age: 70
End: 2020-07-18

## 2020-07-18 ENCOUNTER — OUTPATIENT (OUTPATIENT)
Dept: OUTPATIENT SERVICES | Facility: HOSPITAL | Age: 70
LOS: 1 days | End: 2020-07-18
Payer: COMMERCIAL

## 2020-07-18 ENCOUNTER — RESULT REVIEW (OUTPATIENT)
Age: 70
End: 2020-07-18

## 2020-07-18 PROCEDURE — 74177 CT ABD & PELVIS W/CONTRAST: CPT | Mod: 26,76

## 2020-07-18 PROCEDURE — 74177 CT ABD & PELVIS W/CONTRAST: CPT

## 2020-07-21 ENCOUNTER — APPOINTMENT (OUTPATIENT)
Dept: GYNECOLOGIC ONCOLOGY | Facility: CLINIC | Age: 70
End: 2020-07-21
Payer: COMMERCIAL

## 2020-07-21 VITALS
DIASTOLIC BLOOD PRESSURE: 90 MMHG | SYSTOLIC BLOOD PRESSURE: 132 MMHG | HEIGHT: 70 IN | BODY MASS INDEX: 26.2 KG/M2 | WEIGHT: 183 LBS

## 2020-07-21 PROCEDURE — 99214 OFFICE O/P EST MOD 30 MIN: CPT

## 2020-07-21 PROCEDURE — 36415 COLL VENOUS BLD VENIPUNCTURE: CPT

## 2020-07-22 LAB
ALBUMIN SERPL ELPH-MCNC: 5.1 G/DL
ALP BLD-CCNC: 117 U/L
ALT SERPL-CCNC: 14 U/L
ANION GAP SERPL CALC-SCNC: 17 MMOL/L
AST SERPL-CCNC: 21 U/L
BASOPHILS # BLD AUTO: 0.05 K/UL
BASOPHILS NFR BLD AUTO: 1 %
BILIRUB SERPL-MCNC: 0.3 MG/DL
BUN SERPL-MCNC: 23 MG/DL
CALCIUM SERPL-MCNC: 9.8 MG/DL
CANCER AG125 SERPL-ACNC: 41 U/ML
CHLORIDE SERPL-SCNC: 107 MMOL/L
CO2 SERPL-SCNC: 22 MMOL/L
CREAT SERPL-MCNC: 1.15 MG/DL
EOSINOPHIL # BLD AUTO: 0.1 K/UL
EOSINOPHIL NFR BLD AUTO: 2 %
HCT VFR BLD CALC: 43.2 %
HGB BLD-MCNC: 13.2 G/DL
IMM GRANULOCYTES NFR BLD AUTO: 0.2 %
LYMPHOCYTES # BLD AUTO: 1.36 K/UL
LYMPHOCYTES NFR BLD AUTO: 27.8 %
MAN DIFF?: NORMAL
MCHC RBC-ENTMCNC: 30.1 PG
MCHC RBC-ENTMCNC: 30.6 GM/DL
MCV RBC AUTO: 98.6 FL
MONOCYTES # BLD AUTO: 0.29 K/UL
MONOCYTES NFR BLD AUTO: 5.9 %
NEUTROPHILS # BLD AUTO: 3.08 K/UL
NEUTROPHILS NFR BLD AUTO: 63.1 %
PLATELET # BLD AUTO: 259 K/UL
POTASSIUM SERPL-SCNC: 4.4 MMOL/L
PROT SERPL-MCNC: 7.5 G/DL
RBC # BLD: 4.38 M/UL
RBC # FLD: 12.9 %
SODIUM SERPL-SCNC: 146 MMOL/L
WBC # FLD AUTO: 4.89 K/UL

## 2020-07-23 NOTE — PAST MEDICAL HISTORY
[Perimenopausal] : The patient is perimenopausal [Menarche Age ____] : age at menarche was [unfilled] [Menopause Age____] : age at menopause was [unfilled] [Total Preg ___] : G[unfilled] [Approximately ___] : the LMP was approximately [unfilled] [Living ___] : Living: [unfilled] [Abortions ___] : Abortions:[unfilled] [FreeTextEntry2] : known uterine myomas

## 2020-07-23 NOTE — REVIEW OF SYSTEMS
[Negative] : Respiratory [Neuropathy] : neuropathy [Muscle Weakness] : muscle weakness [Incontinence] : incontinence [FreeTextEntry2] : toes and feel [FreeTextEntry4] : vaginal dryness and sensitive, has leakage of a little urine after she urinates.   [de-identified] : loose stools. hemorrhoids. [de-identified] : still with muscle weakness.  trying to exercise more. It is bothering her to do her job.

## 2020-07-23 NOTE — PHYSICAL EXAM
[Absent] : Adnexa(ae): Absent [External Hemorrhoid] : were present [Abnormal] : Anus, perineum, and rectum: Abnormal [Normal] : Recto-Vaginal Exam: Normal [Restricted in physically strenuous activity but ambulatory and able to carry out work of a light or sedentary nature] : Status 1- Restricted in physically strenuous activity but ambulatory and able to carry out work of a light or sedentary nature, e.g., light house work, office work [de-identified] : hernia noted to R of upper incision, reducible [Tender, Swollen] : nontender, non-swollen [de-identified] : radiation changes, erythema, pettichiae

## 2020-07-23 NOTE — DISCUSSION/SUMMARY
[FreeTextEntry1] : \par -Clinically JEREMIAS\par -Labs including \par -Will call patient with results\par -Follow up in 3 months  via telehealth depending on current lab results \par -Mammogram schedule at the end of this week\par -Colonoscopy importance discussed. I offered referral- pt reports she rather have referral done by PCP. \par \par

## 2020-08-31 ENCOUNTER — TRANSCRIPTION ENCOUNTER (OUTPATIENT)
Age: 70
End: 2020-08-31

## 2020-12-14 ENCOUNTER — APPOINTMENT (OUTPATIENT)
Dept: GYNECOLOGIC ONCOLOGY | Facility: CLINIC | Age: 70
End: 2020-12-14
Payer: COMMERCIAL

## 2020-12-14 VITALS
SYSTOLIC BLOOD PRESSURE: 169 MMHG | TEMPERATURE: 97.7 F | HEART RATE: 86 BPM | HEIGHT: 70 IN | BODY MASS INDEX: 26.34 KG/M2 | WEIGHT: 184 LBS | OXYGEN SATURATION: 96 % | RESPIRATION RATE: 18 BRPM | DIASTOLIC BLOOD PRESSURE: 83 MMHG

## 2020-12-14 PROCEDURE — 99072 ADDL SUPL MATRL&STAF TM PHE: CPT

## 2020-12-14 PROCEDURE — 99214 OFFICE O/P EST MOD 30 MIN: CPT

## 2020-12-14 PROCEDURE — 36415 COLL VENOUS BLD VENIPUNCTURE: CPT

## 2020-12-14 NOTE — HISTORY OF PRESENT ILLNESS
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\par \par Previous Therapy\par 1)D&C hysteroscopy EMB 10/3/17\par    a)Clear cell carcinoma \par 2) EMY, BSO, omentectomy 10/17/17\par    a)Omentum negative \par    b) Uterus, cervix, b/l tubes, ovaries clear cell carcinoma, poorly differentiated\par    c)Right pelvic LN 1/4\par    d) Right para aortic LN 1/2\par    e) Left para aortic LN 7/8\par    f) Left pelvis LN 0/5\par 3) Adjuvant carboplatin and paclitaxel x1, initiated 11/14/2017, cycle # 6 refused by patient\par 4)Pt completed EBRT on 4/26/2018\par 5)CTAP 9/25/18\par    a)Interval development of mild left hydroureter and mild ureteritis at the left UVJ, possibly secondary to radiation change \par 6)PET 10/9/18\par    a)No evidence of disease recurrence \par    b)No significant hydroureteronephrosis appreciated this sturdy. There is however decreased excretion of FDG in the left ureter which may indicate a mild component of obstruction, corresponding to mild left hydroureter and distal ureteritis seen on CT from 9/25/18 \par 7) CTAP 7/2020\par    a) JEREMIAS\par Pt presents today for surveillance visit. Ca-125= 41 7/22/2020 -> 36 10/2019\par \par \par Mammogram 6/2018 -[facility] 2\par Bone Density 7/2018 - [facility]\par Colonoscopy 10 years ago - due now\par

## 2020-12-14 NOTE — DISCUSSION/SUMMARY
[FreeTextEntry1] : Surveillance visit, clinically Sirena\par -Labs drawn today \par \par -Reviewed expectations for recovery and symptom management\par -Continue PT for neuropathy\par -Pt verbalized understanding\par -FOllow up in 6 months\par

## 2020-12-14 NOTE — PHYSICAL EXAM
[Absent] : Adnexa(ae): Absent [Abnormal] : Anus, perineum, and rectum: Abnormal [External Hemorrhoid] : were present [Normal] : Recto-Vaginal Exam: Normal [Restricted in physically strenuous activity but ambulatory and able to carry out work of a light or sedentary nature] : Status 1- Restricted in physically strenuous activity but ambulatory and able to carry out work of a light or sedentary nature, e.g., light house work, office work [Tender, Swollen] : nontender, non-swollen [de-identified] : hernia noted to R of upper incision, reducible [de-identified] : radiation changes, erythema, pettichiae

## 2020-12-14 NOTE — REVIEW OF SYSTEMS
[Negative] : Respiratory [Neuropathy] : neuropathy [Incontinence] : incontinence [Muscle Weakness] : muscle weakness [FreeTextEntry2] : toes and feel [FreeTextEntry4] : vaginal dryness and sensitive, has leakage of a little urine after she urinates.   [de-identified] : loose stools. hemorrhoids. [de-identified] : still with muscle weakness.  trying to exercise more. It is bothering her to do her job.

## 2020-12-15 LAB — CANCER AG125 SERPL-ACNC: 43 U/ML

## 2021-04-27 ENCOUNTER — TRANSCRIPTION ENCOUNTER (OUTPATIENT)
Age: 71
End: 2021-04-27

## 2021-04-28 ENCOUNTER — TRANSCRIPTION ENCOUNTER (OUTPATIENT)
Age: 71
End: 2021-04-28

## 2021-06-24 ENCOUNTER — APPOINTMENT (OUTPATIENT)
Dept: GYNECOLOGIC ONCOLOGY | Facility: CLINIC | Age: 71
End: 2021-06-24
Payer: COMMERCIAL

## 2021-06-24 ENCOUNTER — NON-APPOINTMENT (OUTPATIENT)
Age: 71
End: 2021-06-24

## 2021-06-24 VITALS
SYSTOLIC BLOOD PRESSURE: 124 MMHG | HEIGHT: 70 IN | DIASTOLIC BLOOD PRESSURE: 77 MMHG | HEART RATE: 83 BPM | TEMPERATURE: 97.4 F | OXYGEN SATURATION: 98 % | BODY MASS INDEX: 27.92 KG/M2 | WEIGHT: 195 LBS | RESPIRATION RATE: 18 BRPM

## 2021-06-24 DIAGNOSIS — Z00.00 ENCOUNTER FOR GENERAL ADULT MEDICAL EXAMINATION W/OUT ABNORMAL FINDINGS: ICD-10-CM

## 2021-06-24 PROCEDURE — 99072 ADDL SUPL MATRL&STAF TM PHE: CPT

## 2021-06-24 PROCEDURE — 99214 OFFICE O/P EST MOD 30 MIN: CPT

## 2021-06-28 LAB
ALBUMIN SERPL ELPH-MCNC: 5.1 G/DL
ALP BLD-CCNC: 128 U/L
ALT SERPL-CCNC: 14 U/L
ANION GAP SERPL CALC-SCNC: 16 MMOL/L
AST SERPL-CCNC: 18 U/L
BASOPHILS # BLD AUTO: 0.06 K/UL
BASOPHILS NFR BLD AUTO: 0.8 %
BILIRUB SERPL-MCNC: 0.2 MG/DL
BUN SERPL-MCNC: 31 MG/DL
CALCIUM SERPL-MCNC: 9.8 MG/DL
CANCER AG125 SERPL-ACNC: 47 U/ML
CHLORIDE SERPL-SCNC: 101 MMOL/L
CO2 SERPL-SCNC: 21 MMOL/L
CREAT SERPL-MCNC: 1.13 MG/DL
EOSINOPHIL # BLD AUTO: 0.18 K/UL
EOSINOPHIL NFR BLD AUTO: 2.4 %
HCT VFR BLD CALC: 46.1 %
HGB BLD-MCNC: 14.7 G/DL
IMM GRANULOCYTES NFR BLD AUTO: 0.4 %
LYMPHOCYTES # BLD AUTO: 1.95 K/UL
LYMPHOCYTES NFR BLD AUTO: 26.3 %
MAGNESIUM SERPL-MCNC: 2.1 MG/DL
MAN DIFF?: NORMAL
MCHC RBC-ENTMCNC: 31 PG
MCHC RBC-ENTMCNC: 31.9 GM/DL
MCV RBC AUTO: 97.3 FL
MONOCYTES # BLD AUTO: 0.45 K/UL
MONOCYTES NFR BLD AUTO: 6.1 %
NEUTROPHILS # BLD AUTO: 4.75 K/UL
NEUTROPHILS NFR BLD AUTO: 64 %
PLATELET # BLD AUTO: 315 K/UL
POTASSIUM SERPL-SCNC: 5 MMOL/L
PROT SERPL-MCNC: 7.7 G/DL
RBC # BLD: 4.74 M/UL
RBC # FLD: 12.7 %
SODIUM SERPL-SCNC: 139 MMOL/L
WBC # FLD AUTO: 7.42 K/UL

## 2021-06-29 ENCOUNTER — NON-APPOINTMENT (OUTPATIENT)
Age: 71
End: 2021-06-29

## 2021-07-02 NOTE — DISCUSSION/SUMMARY
[FreeTextEntry1] : Surveillance visit, clinically Sirena\par -Labs drawn today \par s/s recurrence discussed\par -FOllow up in 6 months\par

## 2021-07-02 NOTE — PHYSICAL EXAM
[Absent] : Adnexa(ae): Absent [Abnormal] : Anus, perineum, and rectum: Abnormal [External Hemorrhoid] : were present [Tender, Swollen] : nontender, non-swollen [de-identified] : hernia noted to R of upper incision, reducible [Normal] : Recto-Vaginal Exam: Normal [de-identified] : radiation changes, erythema, pettichiae [Restricted in physically strenuous activity but ambulatory and able to carry out work of a light or sedentary nature] : Status 1- Restricted in physically strenuous activity but ambulatory and able to carry out work of a light or sedentary nature, e.g., light house work, office work

## 2021-07-02 NOTE — HISTORY OF PRESENT ILLNESS
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\par \par Previous Therapy\par 1)D&C hysteroscopy EMB 10/3/17\par    a)Clear cell carcinoma \par 2) EMY, BSO, omentectomy 10/17/17\par    a)Omentum negative \par    b) Uterus, cervix, b/l tubes, ovaries clear cell carcinoma, poorly differentiated\par    c)Right pelvic LN 1/4\par    d) Right para aortic LN 1/2\par    e) Left para aortic LN 7/8\par    f) Left pelvis LN 0/5\par 3) Adjuvant carboplatin and paclitaxel x1, initiated 11/14/2017, cycle # 6 refused by patient\par 4)Pt completed EBRT on 4/26/2018\par 5)CTAP 9/25/18\par    a)Interval development of mild left hydroureter and mild ureteritis at the left UVJ, possibly secondary to radiation change \par 6)PET 10/9/18\par    a)No evidence of disease recurrence \par    b)No significant hydroureteronephrosis appreciated this sturdy. There is however decreased excretion of FDG in the left ureter which may indicate a mild component of obstruction, corresponding to mild left hydroureter and distal ureteritis seen on CT from 9/25/18 \par 7) CTAP 7/2020\par    a) JEREMIAS\par Pt presents today for surveillance visit. Ca-125 has been slightly elevated but stable in the 40s. \par \par \par Mammogram 6/2018 -[facility] 2\par Bone Density 7/2018 - [facility]\par Colonoscopy 10 years ago - due now\par

## 2021-07-02 NOTE — REVIEW OF SYSTEMS
[Negative] : Respiratory [Neuropathy] : neuropathy [Incontinence] : incontinence [Muscle Weakness] : muscle weakness [FreeTextEntry2] : toes and feel [FreeTextEntry4] : vaginal dryness and sensitive, has leakage of a little urine after she urinates.   [de-identified] : loose stools. hemorrhoids. [de-identified] : still with muscle weakness.  trying to exercise more. It is bothering her to do her job.

## 2021-12-04 ENCOUNTER — TRANSCRIPTION ENCOUNTER (OUTPATIENT)
Age: 71
End: 2021-12-04

## 2022-01-20 ENCOUNTER — APPOINTMENT (OUTPATIENT)
Dept: GYNECOLOGIC ONCOLOGY | Facility: CLINIC | Age: 72
End: 2022-01-20
Payer: COMMERCIAL

## 2022-01-20 VITALS
DIASTOLIC BLOOD PRESSURE: 79 MMHG | HEART RATE: 91 BPM | HEIGHT: 70 IN | BODY MASS INDEX: 28.92 KG/M2 | OXYGEN SATURATION: 96 % | SYSTOLIC BLOOD PRESSURE: 147 MMHG | TEMPERATURE: 97.2 F | WEIGHT: 202 LBS

## 2022-01-20 DIAGNOSIS — C80.1 MALIGNANT (PRIMARY) NEOPLASM, UNSPECIFIED: ICD-10-CM

## 2022-01-20 DIAGNOSIS — M79.2 NEURALGIA AND NEURITIS, UNSPECIFIED: ICD-10-CM

## 2022-01-20 DIAGNOSIS — G62.9 NEURALGIA AND NEURITIS, UNSPECIFIED: ICD-10-CM

## 2022-01-20 DIAGNOSIS — N95.2 POSTMENOPAUSAL ATROPHIC VAGINITIS: ICD-10-CM

## 2022-01-20 PROCEDURE — 99213 OFFICE O/P EST LOW 20 MIN: CPT

## 2022-01-20 NOTE — HISTORY OF PRESENT ILLNESS
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\par \par Previous Therapy\par 1)D&C hysteroscopy EMB 10/3/17\par    a)Clear cell carcinoma \par 2) EMY, BSO, omentectomy 10/17/17\par    a)Omentum negative \par    b) Uterus, cervix, b/l tubes, ovaries clear cell carcinoma, poorly differentiated\par    c)Right pelvic LN 1/4\par    d) Right para aortic LN 1/2\par    e) Left para aortic LN 7/8\par    f) Left pelvis LN 0/5\par 3) Adjuvant carboplatin and paclitaxel x1, initiated 11/14/2017, cycle # 6 refused by patient\par 4)Pt completed EBRT on 4/26/2018\par 5)CTAP 9/25/18\par    a)Interval development of mild left hydroureter and mild ureteritis at the left UVJ, possibly secondary to radiation change \par 6)PET 10/9/18\par    a)No evidence of disease recurrence \par    b)No significant hydroureteronephrosis appreciated this sturdy. There is however decreased excretion of FDG in the left ureter which may indicate a mild component of obstruction, corresponding to mild left hydroureter and distal ureteritis seen on CT from 9/25/18 \par 7) CTAP 7/2020\par    a) JEREMIAS\par \par Pt presents today for surveillance visit. Ca-125 has been slightly elevated but stable in the 40s. Feeling well. No GI/ issues. Gained some weight when she was in [place]. mild vaginal dryness. stable incisional hernia symptoms. denies bloating, change in appetite or energy, vaginal bleeding. \par \par \par Mammogram done in [place], will repeat in the next few months here\par Bone Density 7/2018 - [facility] repeat 2023\par Colonoscopy 10 years ago - due now will make an appt \par

## 2022-01-20 NOTE — PHYSICAL EXAM
[Absent] : Adnexa(ae): Absent [Abnormal] : Anus, perineum, and rectum: Abnormal [External Hemorrhoid] : were present [Restricted in physically strenuous activity but ambulatory and able to carry out work of a light or sedentary nature] : Status 1- Restricted in physically strenuous activity but ambulatory and able to carry out work of a light or sedentary nature, e.g., light house work, office work [Chaperone Present] : A chaperone was present in the examining room during all aspects of the physical examination [Normal] : Breasts: Normal [Tender, Swollen] : nontender, non-swollen [de-identified] : hernia noted to R of upper incision, reducible [de-identified] : radiation changes, erythema, pettichiae

## 2022-01-20 NOTE — DISCUSSION/SUMMARY
[FreeTextEntry1] : Surveillance visit, clinically JEREMIAS\par -Labs drawn today  - will follow up Ca-125 and get repeat CT if increasing\par s/s recurrence discussed\par -Follow up in 6 months\par Rx given for mammogram, patient has appt with GI doctor for colonoscopy, bone density UTD\par

## 2022-01-20 NOTE — REVIEW OF SYSTEMS
[Negative] : Respiratory [Neuropathy] : neuropathy [Incontinence] : incontinence [Muscle Weakness] : muscle weakness [FreeTextEntry2] : toes and feel [FreeTextEntry4] : vaginal dryness and sensitive, has leakage of a little urine after she urinates.   [de-identified] : loose stools. hemorrhoids. [de-identified] : still with muscle weakness.  trying to exercise more. It is bothering her to do her job.

## 2022-01-21 LAB
ALBUMIN SERPL ELPH-MCNC: 4.6 G/DL
ALP BLD-CCNC: 131 U/L
ALT SERPL-CCNC: 17 U/L
ANION GAP SERPL CALC-SCNC: 17 MMOL/L
AST SERPL-CCNC: 20 U/L
BASOPHILS # BLD AUTO: 0.04 K/UL
BASOPHILS NFR BLD AUTO: 0.6 %
BILIRUB SERPL-MCNC: 0.2 MG/DL
BUN SERPL-MCNC: 26 MG/DL
CALCIUM SERPL-MCNC: 9 MG/DL
CANCER AG125 SERPL-ACNC: 40 U/ML
CHLORIDE SERPL-SCNC: 102 MMOL/L
CO2 SERPL-SCNC: 20 MMOL/L
CREAT SERPL-MCNC: 1.17 MG/DL
EOSINOPHIL # BLD AUTO: 0.19 K/UL
EOSINOPHIL NFR BLD AUTO: 2.7 %
HCT VFR BLD CALC: 43.3 %
HGB BLD-MCNC: 13.7 G/DL
IMM GRANULOCYTES NFR BLD AUTO: 0.1 %
LYMPHOCYTES # BLD AUTO: 1.85 K/UL
LYMPHOCYTES NFR BLD AUTO: 26.4 %
MAN DIFF?: NORMAL
MCHC RBC-ENTMCNC: 30 PG
MCHC RBC-ENTMCNC: 31.6 GM/DL
MCV RBC AUTO: 95 FL
MONOCYTES # BLD AUTO: 0.53 K/UL
MONOCYTES NFR BLD AUTO: 7.6 %
NEUTROPHILS # BLD AUTO: 4.39 K/UL
NEUTROPHILS NFR BLD AUTO: 62.6 %
PLATELET # BLD AUTO: 291 K/UL
POTASSIUM SERPL-SCNC: 4.4 MMOL/L
PROT SERPL-MCNC: 7.2 G/DL
RBC # BLD: 4.56 M/UL
RBC # FLD: 12.7 %
SODIUM SERPL-SCNC: 138 MMOL/L
WBC # FLD AUTO: 7.01 K/UL

## 2022-07-12 ENCOUNTER — APPOINTMENT (OUTPATIENT)
Dept: GYNECOLOGIC ONCOLOGY | Facility: CLINIC | Age: 72
End: 2022-07-12

## 2022-07-12 VITALS
BODY MASS INDEX: 26.92 KG/M2 | RESPIRATION RATE: 18 BRPM | WEIGHT: 188 LBS | SYSTOLIC BLOOD PRESSURE: 110 MMHG | HEIGHT: 70 IN | HEART RATE: 97 BPM | OXYGEN SATURATION: 96 % | DIASTOLIC BLOOD PRESSURE: 68 MMHG | TEMPERATURE: 97.6 F

## 2022-07-12 DIAGNOSIS — C54.1 MALIGNANT NEOPLASM OF ENDOMETRIUM: ICD-10-CM

## 2022-07-12 PROCEDURE — 99213 OFFICE O/P EST LOW 20 MIN: CPT

## 2022-07-12 NOTE — DISCUSSION/SUMMARY
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

## 2022-07-12 NOTE — PHYSICAL EXAM
[Chaperone Present] : A chaperone was present in the examining room during all aspects of the physical examination [Absent] : Adnexa(ae): Absent [Abnormal] : Anus, perineum, and rectum: Abnormal [External Hemorrhoid] : were present [Tender, Swollen] : nontender, non-swollen [Normal] : Recto-Vaginal Exam: Normal [de-identified] : hernia noted to R of upper incision, reducible [de-identified] : radiation changes, erythema, pettichiae [Restricted in physically strenuous activity but ambulatory and able to carry out work of a light or sedentary nature] : Status 1- Restricted in physically strenuous activity but ambulatory and able to carry out work of a light or sedentary nature, e.g., light house work, office work

## 2022-07-12 NOTE — REVIEW OF SYSTEMS
[Negative] : Respiratory [Neuropathy] : neuropathy [Incontinence] : incontinence [Muscle Weakness] : muscle weakness [FreeTextEntry2] : toes and feel [FreeTextEntry4] : vaginal dryness and sensitive, has leakage of a little urine after she urinates.   [de-identified] : loose stools. hemorrhoids. [de-identified] : still with muscle weakness.  trying to exercise more. It is bothering her to do her job.

## 2022-07-12 NOTE — HISTORY OF PRESENT ILLNESS
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\par \par Previous Therapy\par 1)D&C hysteroscopy EMB 10/3/17\par    a)Clear cell carcinoma \par 2) EMY, BSO, omentectomy 10/17/17\par    a)Omentum negative \par    b) Uterus, cervix, b/l tubes, ovaries clear cell carcinoma, poorly differentiated\par    c)Right pelvic LN 1/4\par    d) Right para aortic LN 1/2\par    e) Left para aortic LN 7/8\par    f) Left pelvis LN 0/5\par 3) Adjuvant carboplatin and paclitaxel x1, initiated 11/14/2017, cycle # 6 refused by patient\par 4)Pt completed EBRT on 4/26/2018\par 5)CTAP 9/25/18\par    a)Interval development of mild left hydroureter and mild ureteritis at the left UVJ, possibly secondary to radiation change \par 6)PET 10/9/18\par    a)No evidence of disease recurrence \par    b)No significant hydroureteronephrosis appreciated this sturdy. There is however decreased excretion of FDG in the left ureter which may indicate a mild component of obstruction, corresponding to mild left hydroureter and distal ureteritis seen on CT from 9/25/18 \par 7) CTAP 7/2020\par    a) JEREMIAS\par \par Pt presents today for surveillance visit. Ca-125 has been slightly elevated but stable in the 40s. Feeling well. No GI/ issues. Changed her diet and has lost 20 pounds!. denies bloating, change in appetite or energy, vaginal bleeding. \par \par \par Mammogram done in [place], will repeat in the next few months here\par Bone Density 7/2018 - [facility] repeat 2023\par Colonoscopy 10 years ago - due now will make an appt \par

## 2022-07-14 LAB
ALBUMIN SERPL ELPH-MCNC: 4.8 G/DL
ALP BLD-CCNC: 136 U/L
ALT SERPL-CCNC: 12 U/L
ANION GAP SERPL CALC-SCNC: 16 MMOL/L
AST SERPL-CCNC: 20 U/L
BASOPHILS # BLD AUTO: 0.05 K/UL
BASOPHILS NFR BLD AUTO: 0.7 %
BILIRUB SERPL-MCNC: 0.3 MG/DL
BUN SERPL-MCNC: 19 MG/DL
CALCIUM SERPL-MCNC: 9.4 MG/DL
CANCER AG125 SERPL-ACNC: 38 U/ML
CHLORIDE SERPL-SCNC: 104 MMOL/L
CO2 SERPL-SCNC: 22 MMOL/L
CREAT SERPL-MCNC: 1.14 MG/DL
EGFR: 52 ML/MIN/1.73M2
EOSINOPHIL # BLD AUTO: 0.15 K/UL
EOSINOPHIL NFR BLD AUTO: 2 %
HCT VFR BLD CALC: 42.8 %
HGB BLD-MCNC: 13.3 G/DL
IMM GRANULOCYTES NFR BLD AUTO: 0.3 %
LYMPHOCYTES # BLD AUTO: 1.76 K/UL
LYMPHOCYTES NFR BLD AUTO: 24 %
MAGNESIUM SERPL-MCNC: 2.1 MG/DL
MAN DIFF?: NORMAL
MCHC RBC-ENTMCNC: 29.6 PG
MCHC RBC-ENTMCNC: 31.1 GM/DL
MCV RBC AUTO: 95.3 FL
MONOCYTES # BLD AUTO: 0.48 K/UL
MONOCYTES NFR BLD AUTO: 6.5 %
NEUTROPHILS # BLD AUTO: 4.87 K/UL
NEUTROPHILS NFR BLD AUTO: 66.5 %
PLATELET # BLD AUTO: 290 K/UL
POTASSIUM SERPL-SCNC: 4.7 MMOL/L
PROT SERPL-MCNC: 7.2 G/DL
RBC # BLD: 4.49 M/UL
RBC # FLD: 13.1 %
SODIUM SERPL-SCNC: 141 MMOL/L
WBC # FLD AUTO: 7.33 K/UL

## 2023-01-17 ENCOUNTER — APPOINTMENT (OUTPATIENT)
Dept: GYNECOLOGIC ONCOLOGY | Facility: CLINIC | Age: 73
End: 2023-01-17

## 2023-10-30 NOTE — REVIEW OF SYSTEMS
Problem: Discharge Planning  Goal: Discharge to home or other facility with appropriate resources  Outcome: Progressing  Flowsheets (Taken 10/29/2023 1200 by Radha Maher RN)  Discharge to home or other facility with appropriate resources:   Identify barriers to discharge with patient and caregiver   Arrange for needed discharge resources and transportation as appropriate   Identify discharge learning needs (meds, wound care, etc)   Refer to discharge planning if patient needs post-hospital services based on physician order or complex needs related to functional status, cognitive ability or social support system     Problem: Safety - Adult  Goal: Free from fall injury  Outcome: Progressing     Problem: ABCDS Injury Assessment  Goal: Absence of physical injury  Outcome: Progressing  Flowsheets (Taken 10/29/2023 1137 by Radha Maher RN)  Absence of Physical Injury: Implement safety measures based on patient assessment     Problem: Pain  Goal: Verbalizes/displays adequate comfort level or baseline comfort level  Outcome: Progressing  Flowsheets  Taken 10/29/2023 1400 by Radha Maher RN  Verbalizes/displays adequate comfort level or baseline comfort level:   Encourage patient to monitor pain and request assistance   Assess pain using appropriate pain scale   Administer analgesics based on type and severity of pain and evaluate response   Implement non-pharmacological measures as appropriate and evaluate response   Consider cultural and social influences on pain and pain management   Notify Licensed Independent Practitioner if interventions unsuccessful or patient reports new pain  Taken 10/29/2023 1004 by Radha Maher RN  Verbalizes/displays adequate comfort level or baseline comfort level:   Encourage patient to monitor pain and request assistance   Assess pain using appropriate pain scale   Administer analgesics based on type and severity of pain and evaluate response   Implement [Negative] : Respiratory [Neuropathy] : neuropathy [Incontinence] : incontinence [Muscle Weakness] : muscle weakness [FreeTextEntry4] : vaginal dryness and sensitive, has leakage of a little urine after she urinates.   [de-identified] : loose stools. hemorrhoids. 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  It is bothering her to do her job.
